# Patient Record
Sex: MALE | Race: WHITE | NOT HISPANIC OR LATINO | Employment: FULL TIME | URBAN - NONMETROPOLITAN AREA
[De-identification: names, ages, dates, MRNs, and addresses within clinical notes are randomized per-mention and may not be internally consistent; named-entity substitution may affect disease eponyms.]

---

## 2018-04-24 ENCOUNTER — TRANSCRIBE ORDERS (OUTPATIENT)
Dept: ADMINISTRATIVE | Facility: HOSPITAL | Age: 53
End: 2018-04-24

## 2018-04-24 ENCOUNTER — APPOINTMENT (OUTPATIENT)
Dept: LAB | Facility: HOSPITAL | Age: 53
End: 2018-04-24
Attending: INTERNAL MEDICINE

## 2018-04-24 DIAGNOSIS — I25.10 CAD IN NATIVE ARTERY: ICD-10-CM

## 2018-04-24 DIAGNOSIS — E78.00 HIGH CHOLESTEROL: Primary | ICD-10-CM

## 2018-04-24 DIAGNOSIS — E10.9 TYPE 1 DIABETES MELLITUS WITHOUT COMPLICATION (HCC): ICD-10-CM

## 2018-04-24 LAB
ALBUMIN SERPL-MCNC: 4 G/DL (ref 3.5–5)
ALBUMIN/GLOB SERPL: 1.5 G/DL (ref 1–2)
ALP SERPL-CCNC: 85 U/L (ref 38–126)
ALT SERPL W P-5'-P-CCNC: 29 U/L (ref 13–69)
ANION GAP SERPL CALCULATED.3IONS-SCNC: 16 MMOL/L (ref 10–20)
AST SERPL-CCNC: 19 U/L (ref 15–46)
BILIRUB SERPL-MCNC: 0.5 MG/DL (ref 0.2–1.3)
BUN BLD-MCNC: 10 MG/DL (ref 7–20)
BUN/CREAT SERPL: 9.1 (ref 6.3–21.9)
CALCIUM SPEC-SCNC: 9 MG/DL (ref 8.4–10.2)
CHLORIDE SERPL-SCNC: 107 MMOL/L (ref 98–107)
CHOLEST SERPL-MCNC: 115 MG/DL (ref 0–199)
CO2 SERPL-SCNC: 27 MMOL/L (ref 26–30)
CREAT BLD-MCNC: 1.1 MG/DL (ref 0.6–1.3)
GFR SERPL CREATININE-BSD FRML MDRD: 71 ML/MIN/1.73
GLOBULIN UR ELPH-MCNC: 2.7 GM/DL
GLUCOSE BLD-MCNC: 94 MG/DL (ref 74–98)
HBA1C MFR BLD: 5.5 % (ref 3–6)
HDLC SERPL-MCNC: 34 MG/DL (ref 40–60)
LDLC SERPL CALC-MCNC: 47 MG/DL (ref 0–99)
LDLC/HDLC SERPL: 1.39 {RATIO}
POTASSIUM BLD-SCNC: 4 MMOL/L (ref 3.5–5.1)
PROT SERPL-MCNC: 6.7 G/DL (ref 6.3–8.2)
SODIUM BLD-SCNC: 146 MMOL/L (ref 137–145)
TRIGL SERPL-MCNC: 169 MG/DL
TSH SERPL DL<=0.05 MIU/L-ACNC: 1.62 MIU/ML (ref 0.47–4.68)
VLDLC SERPL-MCNC: 33.8 MG/DL

## 2018-04-24 PROCEDURE — 36415 COLL VENOUS BLD VENIPUNCTURE: CPT | Performed by: INTERNAL MEDICINE

## 2018-04-24 PROCEDURE — 80053 COMPREHEN METABOLIC PANEL: CPT | Performed by: INTERNAL MEDICINE

## 2018-04-24 PROCEDURE — 80061 LIPID PANEL: CPT | Performed by: INTERNAL MEDICINE

## 2018-04-24 PROCEDURE — 84443 ASSAY THYROID STIM HORMONE: CPT | Performed by: INTERNAL MEDICINE

## 2018-04-24 PROCEDURE — 83036 HEMOGLOBIN GLYCOSYLATED A1C: CPT | Performed by: INTERNAL MEDICINE

## 2018-09-19 ENCOUNTER — TELEPHONE (OUTPATIENT)
Dept: SURGERY | Facility: CLINIC | Age: 53
End: 2018-09-19

## 2018-09-19 NOTE — TELEPHONE ENCOUNTER
Pt says he has never had a colonoscopy before.  He has had a heart attack and we will make him an appt to come in.

## 2018-09-19 NOTE — TELEPHONE ENCOUNTER
Dr. Thomas Wilks wants patient to have colonoscopy screening.  New patient to pcp.  They are not aware of any previous colonoscopy, no heart nor lung issues.  Please call patient to set up colonscopy. Thanks.

## 2018-10-02 ENCOUNTER — OFFICE VISIT (OUTPATIENT)
Dept: SURGERY | Facility: CLINIC | Age: 53
End: 2018-10-02

## 2018-10-02 VITALS
HEART RATE: 78 BPM | WEIGHT: 183.8 LBS | HEIGHT: 71 IN | TEMPERATURE: 98.4 F | BODY MASS INDEX: 25.73 KG/M2 | OXYGEN SATURATION: 99 % | DIASTOLIC BLOOD PRESSURE: 70 MMHG | SYSTOLIC BLOOD PRESSURE: 110 MMHG

## 2018-10-02 DIAGNOSIS — K59.04 CHRONIC IDIOPATHIC CONSTIPATION: ICD-10-CM

## 2018-10-02 DIAGNOSIS — Z12.11 SCREENING FOR COLON CANCER: Primary | ICD-10-CM

## 2018-10-02 PROCEDURE — 99243 OFF/OP CNSLTJ NEW/EST LOW 30: CPT | Performed by: SURGERY

## 2018-10-02 RX ORDER — POLYETHYLENE GLYCOL 3350 17 G/17G
238 POWDER, FOR SOLUTION ORAL ONCE
Qty: 14 PACKET | Refills: 0 | Status: SHIPPED | OUTPATIENT
Start: 2018-10-02 | End: 2018-10-02

## 2018-10-02 RX ORDER — ATORVASTATIN CALCIUM 40 MG/1
40 TABLET, FILM COATED ORAL DAILY
COMMUNITY
Start: 2018-09-27

## 2018-10-02 RX ORDER — RAMIPRIL 5 MG/1
5 CAPSULE ORAL DAILY
COMMUNITY
Start: 2018-09-27

## 2018-10-02 RX ORDER — BISACODYL 5 MG/1
5 TABLET, DELAYED RELEASE ORAL DAILY
Qty: 4 TABLET | Refills: 0 | Status: SHIPPED | OUTPATIENT
Start: 2018-10-02

## 2018-10-02 NOTE — PROGRESS NOTES
Patient: Tyron Araujo    YOB: 1965    Date: 10/02/2018    Primary Care Provider: Thomas Wilks MD    Chief Complaint   Patient presents with   • Constipation       Subjective .     History of present illness: Patient here for a colonoscopy.  Patient has had a recent history of constipation that is somewhat intermittent.  Has bowel movement every other day sometimes.  Complaints of hemorrhoids and occasional bleeding but only with wiping.  No abdominal pain.  No family history of colon cancer.  Never had a colonoscopy in the past.  He has taken a stool softener recently which has helped    The following portions of the patient's history were reviewed and updated as appropriate: allergies, current medications, past family history, past medical history, past social history, past surgical history and problem list.      Review of Systems   Constitutional: Negative for chills, fever and unexpected weight change.   HENT: Negative for trouble swallowing and voice change.    Eyes: Negative for visual disturbance.   Respiratory: Negative for apnea, cough, chest tightness, shortness of breath and wheezing.    Cardiovascular: Negative for chest pain, palpitations and leg swelling.   Gastrointestinal: Positive for blood in stool and constipation. Negative for abdominal distention, abdominal pain, anal bleeding, diarrhea, nausea, rectal pain and vomiting.   Endocrine: Negative for cold intolerance and heat intolerance.   Genitourinary: Negative for difficulty urinating, dysuria, flank pain, scrotal swelling and testicular pain.   Musculoskeletal: Negative for back pain, gait problem and joint swelling.   Skin: Negative for color change, rash and wound.   Neurological: Negative for dizziness, syncope, speech difficulty, weakness, numbness and headaches.   Hematological: Negative for adenopathy. Does not bruise/bleed easily.   Psychiatric/Behavioral: Negative for confusion. The patient is not  "nervous/anxious.        History:  Past Medical History:   Diagnosis Date   • Hyperlipidemia    • Hypertension        Past Surgical History:   Procedure Laterality Date   • APPENDECTOMY     • CARDIAC SURGERY      stent x 2       Family History   Problem Relation Age of Onset   • No Known Problems Mother    • No Known Problems Father    • No Known Problems Sister    • No Known Problems Brother        Social History   Substance Use Topics   • Smoking status: Current Every Day Smoker     Packs/day: 0.10   • Smokeless tobacco: Never Used   • Alcohol use No       Medications:   Current Outpatient Prescriptions:   •  aspirin 81 MG EC tablet, Take 81 mg by mouth Daily., Disp: , Rfl:   •  atorvastatin (LIPITOR) 40 MG tablet, , Disp: , Rfl:   •  ramipril (ALTACE) 5 MG capsule, , Disp: , Rfl:        Allergies: No Known Allergies    Objective     Vital Signs:  Vitals:    10/02/18 0920   BP: 110/70   Pulse: 78   Temp: 98.4 °F (36.9 °C)   TempSrc: Temporal Artery    SpO2: 99%   Weight: 83.4 kg (183 lb 12.8 oz)   Height: 180.3 cm (71\")       Physical Exam:   General Appearance:    Alert, cooperative, in no acute distress   Head:    Normocephalic, without obvious abnormality, atraumatic   Eyes:            Lids and lashes normal, conjunctivae and sclerae normal, no   icterus, no pallor, corneas clear, PERRL   Ears:    Ears appear intact with no abnormalities noted   Lungs:     Clear to auscultation     Heart:    Regular rate and rhythm    Abdomen:     Soft nontender nondistended.  No hepatosplenomegaly.     Extremities:   Moves all extremities well, no edema, no cyanosis, no             redness   Skin:   No bleeding, bruising or rash   Neurologic:   Cranial nerves 2 - 12 grossly intact, sensation intact  Psychiatric: No evidence of depression or anxiety   Results Review:   None    Review of Systems was reviewed and confirmed as accurate today.    Assessment/Plan :    1. Screening for colon cancer    2. Chronic idiopathic " constipation        I recommend a fiber supplement of choice daily for his constipation.  I explained to him that avoiding the constipation will help with the hemorrhoid issue.  I also recommend a screening colonoscopy.  He understands this procedure as well as the risks of bleeding and perforation and he understands the ramifications of these potential complications and he wishes to proceed.      Electronically signed by Burton Elias MD  10/02/18  9:15 AM      Portions of this note have been scribed for Burton Elias MD by Ania Florence. 10/2/2018  9:44 AM

## 2018-11-13 ENCOUNTER — HOSPITAL ENCOUNTER (OUTPATIENT)
Facility: HOSPITAL | Age: 53
Setting detail: HOSPITAL OUTPATIENT SURGERY
Discharge: HOME | End: 2018-11-13
Attending: SURGERY | Admitting: SURGERY

## 2018-11-13 ENCOUNTER — ANESTHESIA (OUTPATIENT)
Dept: GASTROENTEROLOGY | Facility: HOSPITAL | Age: 53
End: 2018-11-13

## 2018-11-13 ENCOUNTER — ANESTHESIA EVENT (OUTPATIENT)
Dept: GASTROENTEROLOGY | Facility: HOSPITAL | Age: 53
End: 2018-11-13

## 2018-11-13 VITALS
SYSTOLIC BLOOD PRESSURE: 126 MMHG | RESPIRATION RATE: 16 BRPM | BODY MASS INDEX: 26.6 KG/M2 | TEMPERATURE: 97.9 F | WEIGHT: 190 LBS | OXYGEN SATURATION: 95 % | HEART RATE: 61 BPM | DIASTOLIC BLOOD PRESSURE: 73 MMHG | HEIGHT: 71 IN

## 2018-11-13 DIAGNOSIS — Z12.11 SCREENING FOR COLON CANCER: ICD-10-CM

## 2018-11-13 PROCEDURE — 25010000002 PROPOFOL 10 MG/ML EMULSION: Performed by: NURSE ANESTHETIST, CERTIFIED REGISTERED

## 2018-11-13 PROCEDURE — S0260 H&P FOR SURGERY: HCPCS | Performed by: SURGERY

## 2018-11-13 RX ORDER — ONDANSETRON 2 MG/ML
4 INJECTION INTRAMUSCULAR; INTRAVENOUS ONCE AS NEEDED
Status: CANCELLED | OUTPATIENT
Start: 2018-11-13 | End: 2018-11-13

## 2018-11-13 RX ORDER — LIDOCAINE HYDROCHLORIDE 20 MG/ML
INJECTION, SOLUTION INFILTRATION; PERINEURAL AS NEEDED
Status: DISCONTINUED | OUTPATIENT
Start: 2018-11-13 | End: 2018-11-13 | Stop reason: SURG

## 2018-11-13 RX ORDER — PROPOFOL 10 MG/ML
VIAL (ML) INTRAVENOUS AS NEEDED
Status: DISCONTINUED | OUTPATIENT
Start: 2018-11-13 | End: 2018-11-13 | Stop reason: SURG

## 2018-11-13 RX ORDER — SODIUM CHLORIDE, SODIUM LACTATE, POTASSIUM CHLORIDE, CALCIUM CHLORIDE 600; 310; 30; 20 MG/100ML; MG/100ML; MG/100ML; MG/100ML
1000 INJECTION, SOLUTION INTRAVENOUS CONTINUOUS
Status: DISCONTINUED | OUTPATIENT
Start: 2018-11-13 | End: 2018-11-13 | Stop reason: HOSPADM

## 2018-11-13 RX ORDER — ONDANSETRON 2 MG/ML
4 INJECTION INTRAMUSCULAR; INTRAVENOUS ONCE AS NEEDED
Status: DISCONTINUED | OUTPATIENT
Start: 2018-11-13 | End: 2018-11-13 | Stop reason: HOSPADM

## 2018-11-13 RX ADMIN — LIDOCAINE HYDROCHLORIDE 100 MG: 20 INJECTION, SOLUTION INFILTRATION; PERINEURAL at 12:33

## 2018-11-13 RX ADMIN — SODIUM CHLORIDE, POTASSIUM CHLORIDE, SODIUM LACTATE AND CALCIUM CHLORIDE 1000 ML: 600; 310; 30; 20 INJECTION, SOLUTION INTRAVENOUS at 10:58

## 2018-11-13 RX ADMIN — PROPOFOL 200 MG: 10 INJECTION, EMULSION INTRAVENOUS at 13:00

## 2018-11-13 NOTE — H&P
"    [facility]   HISTORY AND PHYSICAL      Name:  Tyron Araujo   Age:  53 y.o.  Sex:  male  :  1965  MRN:  [id number]   Visit Number:  [id number]  Admission Date:  2018  Date Of Service:  18  Primary Care Physician:  Thomas Wilks MD    Chief Complaint:     I need a colonoscopy    History Of Presenting Illness:      She presents for colonoscopy.  Review Of Systems:     General ROS: Patient denies any fevers, chills or loss of consciousness.  No complaints of generalized weakness  Psychological ROS: Denies any hallucinations and delusions.  Respiratory ROS: Denies cough or shortness of breath.   Cardiovascular ROS: Denies chest pain or palpitations. No history of exertional chest pain.   Gastrointestinal ROS: Denies nausea and vomiting. Denies any abdominal pain. No diarrhea.Has history of constipation   Genito-Urinary ROS: Denies dysuria or hematuria.  Neurological ROS: Denies any focal weakness. No loss of consciousness. Denies any numbness.   Dermatological ROS: Denies any redness or pruritis.     Past Medical History:    Past Medical History:   Diagnosis Date   • Arthritis    • History of bronchitis    • Hyperlipidemia    • Hypertension    • Sleep apnea     PER PT, DR. QUINTERO SAYS I HAVE SLEEP APNEA. PT DENIES EVER HAVING A SLEEP STUDY OR WEARING A CPAP/BIPAP   • Tattoos     X 2   • Wears glasses     RX       Past Surgical history:    Past Surgical History:   Procedure Laterality Date   • AMPUTATION DIGIT Left     LOST \"TIP\" OF 2 FINGERS, MIDDLE AND RING FINGER   • APPENDECTOMY     • CARDIAC SURGERY  2015    stent x 2   • MULTIPLE TOOTH EXTRACTIONS         Social History:    Social History     Socioeconomic History   • Marital status:      Spouse name: Not on file   • Number of children: Not on file   • Years of education: Not on file   • Highest education level: Not on file   Social Needs   • Financial resource strain: Not on file   • Food " insecurity - worry: Not on file   • Food insecurity - inability: Not on file   • Transportation needs - medical: Not on file   • Transportation needs - non-medical: Not on file   Occupational History   • Not on file   Tobacco Use   • Smoking status: Current Every Day Smoker     Packs/day: 0.10     Types: Cigarettes   • Smokeless tobacco: Never Used   • Tobacco comment: 4-5 CIGARETTES A DAY    Substance and Sexual Activity   • Alcohol use: No   • Drug use: No   • Sexual activity: Defer   Other Topics Concern   • Not on file   Social History Narrative   • Not on file       Family History:    Family History   Problem Relation Age of Onset   • No Known Problems Mother    • No Known Problems Father    • No Known Problems Sister    • No Known Problems Brother        Allergies:      Patient has no known allergies.    Home Medications:    Prior to Admission Medications     Prescriptions Last Dose Informant Patient Reported? Taking?    aspirin 81 MG EC tablet 11/12/2018 Self Yes Yes    Take 81 mg by mouth Daily.    atorvastatin (LIPITOR) 40 MG tablet 11/12/2018 Self Yes Yes    Take 40 mg by mouth Daily.    bisacodyl (bisacodyl) 5 MG EC tablet 11/12/2018  No Yes    Take 1 tablet by mouth Daily.    ramipril (ALTACE) 5 MG capsule 11/13/2018 Self Yes Yes    Take 5 mg by mouth Daily.             ED Medications:    Medications   lactated ringers infusion 1,000 mL (1,000 mL Intravenous New Bag 11/13/18 1058)       Vital Signs:    Temp:  [97.7 °F (36.5 °C)] 97.7 °F (36.5 °C)  Heart Rate:  [57] 57  Resp:  [18] 18  BP: (123)/(78) 123/78        11/12/18  0927   Weight: 86.2 kg (190 lb)       Body mass index is 26.5 kg/m².    Physical Exam:      General Appearance:  Alert and cooperative, not in any acute distress.   Head:  Atraumatic and normocephalic, without obvious abnormality.   Eyes:          PERRLA, conjunctivae and sclerae normal, no Icterus. No pallor. Extra-occular movements are within normal limits.   Ears:  Ears appear  intact with no abnormalities noted.   Respiratory/Lungs:   Breath sounds heard bilaterally equally.  No crackles or wheezing. No Pleural rub or bronchial breathing. Normal respiratory effort.    Cardiovascular/Heart:  Normal S1 and S2,    GI/Abdomen:   No masses, no hepatosplenomegaly. Soft, non-tender, non-distended, no hernia                 Musculoskeletal/ Extremities:   Moves all extremities well   Skin: No bleeding, bruising or rash, no induration   Psychiatric : Alert and oriented ×3.  No depression or anxiety    Neurologic: Cranial nerves 2 - 12 grossly intact, sensation intact, Motor power is normal and equal bilaterally.       EKG:          Labs:    Lab Results (last 24 hours)     ** No results found for the last 24 hours. **          Radiology:    Imaging Results (last 72 hours)     ** No results found for the last 72 hours. **          Assessment:    Screening colonoscopy     Plan:     I recommend a screening colonoscopy to the patient.  The patient understands the procedure and the reason for the procedure.  The patient also understands the risks which include but are not limited to bleeding and perforation and they understand the ramifications of these potential complications and wish to proceed.    Burton Elias MD  11/13/18  12:39 PM

## 2018-11-13 NOTE — ANESTHESIA POSTPROCEDURE EVALUATION
Patient: Tyron Araujo    Procedure Summary     Date:  11/13/18 Room / Location:  [facility] ENDOSCOPY 1 / [facility] ENDOSCOPY    Anesthesia Start:  1230 Anesthesia Stop:  1302    Procedure:  COLONOSCOPY W/ COLD SNARE POLYPECTOOMY X1 (N/A Anus) Diagnosis:       Screening for colon cancer      (Screening for colon cancer [Z12.11])    Surgeon:  Burton Elias MD Provider:  Elgin Barrios CRNA    Anesthesia Type:  MAC ASA Status:  2          Anesthesia Type: MAC  Last vitals  BP   126/73 (11/13/18 1340)   Temp   97.9 °F (36.6 °C) (11/13/18 1313)   Pulse   61 (11/13/18 1340)   Resp   16 (11/13/18 1340)     SpO2   95 % (11/13/18 1340)     Post Anesthesia Care and Evaluation    Patient location during evaluation: PHASE II  Patient participation: complete - patient participated  Level of consciousness: awake  Pain score: 3  Pain management: adequate  Airway patency: patent  Anesthetic complications: No anesthetic complications  PONV Status: controlled  Cardiovascular status: acceptable and stable  Respiratory status: acceptable  Hydration status: acceptable

## 2018-11-13 NOTE — ANESTHESIA PREPROCEDURE EVALUATION
Anesthesia Evaluation     Patient summary reviewed and Nursing notes reviewed   no history of anesthetic complications:  NPO Solid Status: > 8 hours  NPO Liquid Status: > 8 hours           Airway   Mallampati: II  TM distance: >3 FB  Neck ROM: full  no difficulty expected  Dental - normal exam     Pulmonary - normal exam   (+) a smoker Current Smoked day of surgery,   Cardiovascular - normal exam    Rhythm: regular  Rate: normal    (+) hypertension well controlled less than 2 medications, hyperlipidemia,       Neuro/Psych- negative ROS  GI/Hepatic/Renal/Endo - negative ROS     Musculoskeletal     Abdominal    Substance History - negative use     OB/GYN negative ob/gyn ROS         Other   (+) arthritis                     Anesthesia Plan    ASA 2     MAC   (Pt told that intravenous sedation will be used as the primary anesthetic along with local anesthesia if necessary. Every effort will be made to make sure the patient is comfortable.     The patient was told they may or may not have recall for the procedure. It was further explained that if the MAC was not adequate that a general anesthetic with either an LMA or endotracheal tube would be required.     Will proceed with the plan of care.)  intravenous induction   Anesthetic plan, all risks, benefits, and alternatives have been provided, discussed and informed consent has been obtained with: patient.

## 2018-11-13 NOTE — DISCHARGE INSTRUCTIONS
Please follow all post op instructions and follow up appointment time from your physician's office included in your discharge packet.     No pushing, pulling, tugging,  heavy lifting, or strenuous activity.  No major decision making, driving, or drinking alcoholic beverages for 24 hours. ( due to the medications you have  received)  Always use good hand hygiene/washing techniques.  NO driving while taking pain medications.    To assist you in voiding:  Drink plenty of fluids  Listen to running water while attempting to void.    If you are unable to urinate and you have an uncomfortable urge to void or it has been   6 hours since you were discharged, return to the Emergency Room

## 2018-11-17 LAB
LAB AP CASE REPORT: NORMAL
PATH REPORT.FINAL DX SPEC: NORMAL

## 2019-04-30 ENCOUNTER — TRANSCRIBE ORDERS (OUTPATIENT)
Dept: LAB | Facility: HOSPITAL | Age: 54
End: 2019-04-30

## 2019-04-30 ENCOUNTER — LAB (OUTPATIENT)
Dept: LAB | Facility: HOSPITAL | Age: 54
End: 2019-04-30

## 2019-04-30 DIAGNOSIS — E11.9 DIABETES MELLITUS WITHOUT COMPLICATION (HCC): ICD-10-CM

## 2019-04-30 DIAGNOSIS — E78.00 HIGH CHOLESTEROL: Primary | ICD-10-CM

## 2019-04-30 DIAGNOSIS — E78.00 HIGH CHOLESTEROL: ICD-10-CM

## 2019-04-30 LAB
ALBUMIN SERPL-MCNC: 4.3 G/DL (ref 3.5–5)
ALBUMIN/GLOB SERPL: 1.5 G/DL (ref 1–2)
ALP SERPL-CCNC: 81 U/L (ref 38–126)
ALT SERPL W P-5'-P-CCNC: 23 U/L (ref 13–69)
ANION GAP SERPL CALCULATED.3IONS-SCNC: 10.2 MMOL/L (ref 10–20)
AST SERPL-CCNC: 21 U/L (ref 15–46)
BILIRUB SERPL-MCNC: 0.5 MG/DL (ref 0.2–1.3)
BUN BLD-MCNC: 10 MG/DL (ref 7–20)
BUN/CREAT SERPL: 9.1 (ref 6.3–21.9)
CALCIUM SPEC-SCNC: 9.2 MG/DL (ref 8.4–10.2)
CHLORIDE SERPL-SCNC: 104 MMOL/L (ref 98–107)
CHOLEST SERPL-MCNC: 138 MG/DL (ref 0–199)
CO2 SERPL-SCNC: 27 MMOL/L (ref 26–30)
CREAT BLD-MCNC: 1.1 MG/DL (ref 0.6–1.3)
GFR SERPL CREATININE-BSD FRML MDRD: 70 ML/MIN/1.73
GLOBULIN UR ELPH-MCNC: 2.8 GM/DL
GLUCOSE BLD-MCNC: 98 MG/DL (ref 74–98)
HBA1C MFR BLD: 5.6 % (ref 3–6)
HDLC SERPL-MCNC: 33 MG/DL (ref 40–60)
LDLC SERPL CALC-MCNC: 66 MG/DL (ref 0–99)
LDLC/HDLC SERPL: 1.99 {RATIO}
POTASSIUM BLD-SCNC: 4.2 MMOL/L (ref 3.5–5.1)
PROT SERPL-MCNC: 7.1 G/DL (ref 6.3–8.2)
SODIUM BLD-SCNC: 137 MMOL/L (ref 137–145)
TRIGL SERPL-MCNC: 197 MG/DL
VLDLC SERPL-MCNC: 39.4 MG/DL

## 2019-04-30 PROCEDURE — 36415 COLL VENOUS BLD VENIPUNCTURE: CPT

## 2019-04-30 PROCEDURE — 80053 COMPREHEN METABOLIC PANEL: CPT

## 2019-04-30 PROCEDURE — 80061 LIPID PANEL: CPT

## 2019-04-30 PROCEDURE — 83036 HEMOGLOBIN GLYCOSYLATED A1C: CPT

## 2022-11-21 ENCOUNTER — HOSPITAL ENCOUNTER (OUTPATIENT)
Dept: GENERAL RADIOLOGY | Facility: HOSPITAL | Age: 57
Discharge: HOME | End: 2022-11-21
Admitting: NURSE PRACTITIONER

## 2022-11-21 ENCOUNTER — TRANSCRIBE ORDERS (OUTPATIENT)
Dept: GENERAL RADIOLOGY | Facility: HOSPITAL | Age: 57
End: 2022-11-21

## 2022-11-21 DIAGNOSIS — S69.91XA INJURY OF HAND, RIGHT, INITIAL ENCOUNTER: Primary | ICD-10-CM

## 2022-11-21 DIAGNOSIS — S69.91XA INJURY OF HAND, RIGHT, INITIAL ENCOUNTER: ICD-10-CM

## 2022-11-21 PROCEDURE — 73130 X-RAY EXAM OF HAND: CPT

## 2022-12-12 ENCOUNTER — OFFICE VISIT (OUTPATIENT)
Dept: UROLOGY | Facility: CLINIC | Age: 57
End: 2022-12-12

## 2022-12-12 VITALS
HEART RATE: 70 BPM | RESPIRATION RATE: 16 BRPM | HEIGHT: 71 IN | SYSTOLIC BLOOD PRESSURE: 134 MMHG | DIASTOLIC BLOOD PRESSURE: 78 MMHG | BODY MASS INDEX: 25.2 KG/M2 | OXYGEN SATURATION: 98 % | TEMPERATURE: 97.1 F | WEIGHT: 180 LBS

## 2022-12-12 DIAGNOSIS — R97.20 ELEVATED PROSTATE SPECIFIC ANTIGEN (PSA): Primary | ICD-10-CM

## 2022-12-12 LAB
BILIRUB BLD-MCNC: ABNORMAL MG/DL
CLARITY, POC: CLEAR
COLOR UR: ABNORMAL
EXPIRATION DATE: ABNORMAL
GLUCOSE UR STRIP-MCNC: NEGATIVE MG/DL
KETONES UR QL: NEGATIVE
LEUKOCYTE EST, POC: NEGATIVE
Lab: ABNORMAL
NITRITE UR-MCNC: NEGATIVE MG/ML
PH UR: 6 [PH] (ref 5–8)
PROT UR STRIP-MCNC: NEGATIVE MG/DL
RBC # UR STRIP: NEGATIVE /UL
SP GR UR: 1.03 (ref 1–1.03)
UROBILINOGEN UR QL: NORMAL

## 2022-12-12 PROCEDURE — 51798 US URINE CAPACITY MEASURE: CPT | Performed by: PHYSICIAN ASSISTANT

## 2022-12-12 PROCEDURE — 81003 URINALYSIS AUTO W/O SCOPE: CPT | Performed by: PHYSICIAN ASSISTANT

## 2022-12-12 PROCEDURE — 99204 OFFICE O/P NEW MOD 45 MIN: CPT | Performed by: PHYSICIAN ASSISTANT

## 2022-12-12 RX ORDER — AMLODIPINE BESYLATE 5 MG/1
TABLET ORAL
COMMUNITY
Start: 2022-11-21

## 2022-12-12 NOTE — PROGRESS NOTES
Chief Complaint  Elevated PSA    Referring Provider  Katty Stevenson APRN HPI  Mr. Araujo is a 57 y.o.  male who presents with an elevated PSA to 6.2 on 11/21/22.    He states he has never seen a urologist. His PSA was about 3.0 in 2018 and has been gradually increasing since. He describes a trend for 4.8 to 5.3, and now above 6. He denies FH of prostate cancer.       Patient complains of nocturia.  His IPSS score is 9.       IPSS Questionnaire (AUA-7):  Incomplete emptying  Over the past month, how often have you had a sensation of not emptying your bladder completely after you finish?: Less than 1 time in 5 (12/12/22 0945)  Frequency  Over the past month, how often have you had to urinate again less than two hours after you finishing urinating ?: Less than half the time (12/12/22 0945)  Intermittency  Over the past month, how often have you found you stopped and started again several time when you urinated ?: Less thank 1 time in 5 (12/12/22 0945)  Urgency  Over the last month, how difficult  have you found it to postpone urination ?: Less than 1 time in 5 (12/12/22 0945)  Weak Stream  Over the past month, how often have you had a weak urinary stream ?: Less than half the time (12/12/22 0945)  Straining  Over the past month, how often have you had to push or strain to begin urination ?: Less than 1 time 5 (12/12/22 0945)  Nocturia  Over the past month, how many times did you most typically get up to urinate from the time you went to bed until the time you got up in the morning ?: Less than 1 time in 5 (12/12/22 0945)  Quality of life due to urinary symptoms  If you were to spend the rest of your life with your urinary condition the way it is now, how would feel about that?: Mixed - about equally satisfied (12/12/22 0945)    Scores  Total IPSS Score: 9 (12/12/22 0945)  Total Score = Symtomatic Level: moderately symptomatic: 8-19 (12/12/22 0945)       Past Medical History  Past Medical History:  "  Diagnosis Date   • Arthritis    • History of bronchitis    • Hyperlipidemia    • Hypertension    • Sleep apnea     PER PT, DR. QUINTERO SAYS I HAVE SLEEP APNEA. PT DENIES EVER HAVING A SLEEP STUDY OR WEARING A CPAP/BIPAP   • Tattoos     X 2   • Wears glasses     RX       Past Surgical History  Past Surgical History:   Procedure Laterality Date   • AMPUTATION DIGIT Left     LOST \"TIP\" OF 2 FINGERS, MIDDLE AND RING FINGER   • APPENDECTOMY     • CARDIAC SURGERY  03/2015    stent x 2   • COLONOSCOPY N/A 11/13/2018    Procedure: COLONOSCOPY W/ COLD SNARE POLYPECTOOMY X1;  Surgeon: Burton Elias MD;  Location: [facility] ENDOSCOPY;  Service: Gastroenterology   • MULTIPLE TOOTH EXTRACTIONS         Medications    Current Outpatient Medications:   •  amLODIPine (NORVASC) 5 MG tablet, , Disp: , Rfl:   •  aspirin 81 MG EC tablet, Take 81 mg by mouth Daily., Disp: , Rfl:   •  atorvastatin (LIPITOR) 40 MG tablet, Take 40 mg by mouth Daily., Disp: , Rfl:   •  bisacodyl (bisacodyl) 5 MG EC tablet, Take 1 tablet by mouth Daily., Disp: 4 tablet, Rfl: 0  •  ramipril (ALTACE) 5 MG capsule, Take 5 mg by mouth Daily., Disp: , Rfl:     Allergies  No Known Allergies    Social History  Social History     Tobacco Use   • Smoking status: Every Day     Packs/day: 0.10     Types: Cigarettes   • Smokeless tobacco: Never   • Tobacco comments:     4-5 CIGARETTES A DAY    Substance Use Topics   • Alcohol use: No   • Drug use: No       Family History  Family History   Problem Relation Age of Onset   • No Known Problems Mother    • No Known Problems Father    • No Known Problems Sister    • No Known Problems Brother       He has no family history of prostate cancer      Physical Exam  Visit Vitals  /78 (BP Location: Left arm, Patient Position: Sitting, Cuff Size: Adult)   Pulse 70   Temp 97.1 °F (36.2 °C) (Temporal)   Resp 16   Ht 180.3 cm (71\")   Wt 81.6 kg (180 lb)   SpO2 98%   BMI 25.10 kg/m²         Labs  Brief Urine Lab Results  (Last " result in the past 365 days)      Color   Clarity   Blood   Leuk Est   Nitrite   Protein   CREAT   Urine HCG        12/12/22 1022 Dark Yellow   Clear   Negative   Negative   Negative   Negative                 Assessment  Mr. Araujo is a 57 y.o.  male who presents with elevated PSA to 6.2.  This is a new diagnosis of uncertain clinical prognosis.    He states this has been a trend since 2018. He was 3.0 then, now yearly trending up from 4.8 to 5.3, now 6.2.    I explained to the patient that an elevated PSA is a marker of risk of prostate cancer and a prostate biopsy would be the next step in diagnosis. I explained that sampling error can occur with any biopsy and there is a risk of potentially missing a cancer that may be present.    I discussed the risks, benefits, and alternatives to prostate biopsy, including hematuria, hematochezia, and hematospermia.  I also discussed the risk of diagnosing a clinically-insignificant prostate cancer.  I discussed the risks of sepsis, which can be minimized by prophylactic antibiotics.       Plan  1. I have recommended TRUS biopsy of prostate  2. I provided a prescription for Ciprofloxacin 500mg PO BID and Keflex 500mg PO BID for 3 days to start the day prior to biopsy, as well as fleets enema  3. No blood thinners- ASA 81mg only

## 2022-12-13 RX ORDER — CEPHALEXIN 500 MG/1
500 CAPSULE ORAL 2 TIMES DAILY
Qty: 6 CAPSULE | Refills: 0 | Status: SHIPPED | OUTPATIENT
Start: 2022-12-13 | End: 2022-12-16

## 2022-12-13 RX ORDER — MAGNESIUM HYDROXIDE 1200 MG/15ML
1 LIQUID ORAL ONCE AS NEEDED
Qty: 1 ENEMA | Refills: 0 | Status: SHIPPED | OUTPATIENT
Start: 2022-12-13

## 2022-12-13 RX ORDER — CIPROFLOXACIN 500 MG/1
500 TABLET, FILM COATED ORAL 2 TIMES DAILY
Qty: 6 TABLET | Refills: 0 | Status: SHIPPED | OUTPATIENT
Start: 2022-12-13

## 2023-01-23 ENCOUNTER — PROCEDURE VISIT (OUTPATIENT)
Dept: UROLOGY | Facility: CLINIC | Age: 58
End: 2023-01-23
Payer: COMMERCIAL

## 2023-01-23 DIAGNOSIS — R97.20 ELEVATED PROSTATE SPECIFIC ANTIGEN (PSA): Primary | ICD-10-CM

## 2023-01-23 PROCEDURE — 55700 PR PROSTATE NEEDLE BIOPSY ANY APPROACH: CPT | Performed by: UROLOGY

## 2023-01-23 PROCEDURE — 76942 ECHO GUIDE FOR BIOPSY: CPT | Performed by: UROLOGY

## 2023-01-23 NOTE — PATIENT INSTRUCTIONS
No heavy lifting > greater than 30 pounds for 1 week.  Patient needs several days off after prostate biopsy has my permission.

## 2023-01-23 NOTE — PROGRESS NOTES
TRUS BIOPSY OF PROSTATE    Preoperative diagnosis  Elevated PSA    Postoperative diagnosis  Elevated PSA    Procedure  1.  Transrectal ultrasound of the prostate  2.  Transrectal ultrasound guidance of needle biopsy  3.  Prostate biopsy    Attending Surgeon  Nelson Giron MD    Anesthesia  2% lidocaine jelly, intrarectal instillation, 10mL  1% lidocaine solution, periprostatic injection, 10mL    Complications  None    Specimen  Prostate biopsy x 14    Indications  Mr. Araujo is a 58 y.o. year old male with an elevated PSA: No results found for: PSA        After discussing his options, the patient decided to proceed with prostate biopsy.  Informed consent was obtained. Possible complications were discussed with the patient during his last visit including, but not limited to, hematuria, hematochezia, prostatitis, urinary tract infection, sepsis, and urinary retention.  He did start the prescribed oral antibiotic regimen.    Procedure  The patient was positioned and prepped in a left lateral position with lower extremities flexed.  Lidocaine jelly, 2%, was injected per rectum. A digital rectal exam was performed.The Behind the [employer]er E8CS rectal ultrasound probe was slowly introduced into the rectum without difficulty.  The prostate and seminal vesicles were inspected systematically using cross and sagittal views with the ultrasound.  The dimensions of the prostate were measured, for a calculated volume of 45 mL.  Using a true cut 14 Fr biopsy needle, 14 prostate cores were collected. The specific locations were the following: left lateral base, left lateral mid, left lateral apex, left medial base, left medial mid, and left medial apex, right lateral base, right lateral mid, right lateral apex, right medial base, right medial mid, right medial apex, and right and left transition zones. The rectal ultrasound probe was removed.  A DIAMOND was again performed revealing little blood in the rectum.  The patient tolerated the procedure  well.    Plan  1.  The patient was instructed to drink plenty of fluids and warned about possible complications and side effects including, but not limited to, blood in the urine, stool and semen as well as bloodstream infection.  He was instructed to call the office if there are any issues, especially fevers or flu-like symptoms.    2.  Continue antibiotic for a total of 3 days.  3.  The patient will return to clinic in approximately 1 week for discussion of the pathological report.

## 2023-01-26 LAB — REF LAB TEST METHOD: NORMAL

## 2023-01-30 ENCOUNTER — OFFICE VISIT (OUTPATIENT)
Dept: UROLOGY | Facility: CLINIC | Age: 58
End: 2023-01-30
Payer: COMMERCIAL

## 2023-01-30 DIAGNOSIS — R97.20 ELEVATED PROSTATE SPECIFIC ANTIGEN (PSA): Primary | ICD-10-CM

## 2023-01-30 PROCEDURE — 99442 PR PHYS/QHP TELEPHONE EVALUATION 11-20 MIN: CPT | Performed by: UROLOGY

## 2023-01-30 NOTE — PROGRESS NOTES
12-minute telephone conversation.    I reviewed the patient's prostate biopsy results with him, which were negative for any cancer.  We did also discussed his lower urinary tract symptoms.  He has difficulty voiding the first time when he gets up in the morning, but after that he states his urinary stream is normal for the rest of the day and is fairly happy with it.  I offered him medications or further work-up, and he wants to hold off on those for now.    We will have him follow-up in a year with repeat PSA with my SERENE.  I would get an MRI if he goes up substantially, but if it goes up just a little bit or stay stable, consistent with BPH, we would just continue yearly screening.     You have chosen to receive care through a telephone visit. Do you consent to use a telephone visit for your medical care today? Yes

## 2023-02-01 ENCOUNTER — HOSPITAL ENCOUNTER (EMERGENCY)
Facility: HOSPITAL | Age: 58
Discharge: HOME | End: 2023-02-01
Attending: STUDENT IN AN ORGANIZED HEALTH CARE EDUCATION/TRAINING PROGRAM | Admitting: STUDENT IN AN ORGANIZED HEALTH CARE EDUCATION/TRAINING PROGRAM
Payer: COMMERCIAL

## 2023-02-01 VITALS
BODY MASS INDEX: 25.2 KG/M2 | WEIGHT: 180 LBS | HEART RATE: 56 BPM | OXYGEN SATURATION: 100 % | DIASTOLIC BLOOD PRESSURE: 94 MMHG | HEIGHT: 71 IN | SYSTOLIC BLOOD PRESSURE: 156 MMHG | TEMPERATURE: 97.6 F | RESPIRATION RATE: 14 BRPM

## 2023-02-01 DIAGNOSIS — N40.0 ENLARGED PROSTATE: Primary | ICD-10-CM

## 2023-02-01 LAB
BACTERIA UR QL AUTO: ABNORMAL /HPF
BILIRUB UR QL STRIP: NEGATIVE
CLARITY UR: CLEAR
COLOR UR: YELLOW
GLUCOSE UR STRIP-MCNC: NEGATIVE MG/DL
HGB UR QL STRIP.AUTO: ABNORMAL
HYALINE CASTS UR QL AUTO: ABNORMAL /LPF
KETONES UR QL STRIP: NEGATIVE
LEUKOCYTE ESTERASE UR QL STRIP.AUTO: NEGATIVE
NITRITE UR QL STRIP: NEGATIVE
PH UR STRIP.AUTO: 6 [PH] (ref 5–8)
PROT UR QL STRIP: NEGATIVE
RBC # UR STRIP: ABNORMAL /HPF
REF LAB TEST METHOD: ABNORMAL
SP GR UR STRIP: 1.01 (ref 1–1.03)
SQUAMOUS #/AREA URNS HPF: ABNORMAL /HPF
UROBILINOGEN UR QL STRIP: ABNORMAL
WBC # UR STRIP: ABNORMAL /HPF

## 2023-02-01 PROCEDURE — 81001 URINALYSIS AUTO W/SCOPE: CPT | Performed by: PHYSICIAN ASSISTANT

## 2023-02-01 PROCEDURE — 99283 EMERGENCY DEPT VISIT LOW MDM: CPT

## 2023-02-01 RX ORDER — TAMSULOSIN HYDROCHLORIDE 0.4 MG/1
1 CAPSULE ORAL DAILY
Qty: 30 CAPSULE | Refills: 0 | Status: SHIPPED | OUTPATIENT
Start: 2023-02-01 | End: 2023-03-17 | Stop reason: SDUPTHER

## 2023-02-01 RX ORDER — TAMSULOSIN HYDROCHLORIDE 0.4 MG/1
0.4 CAPSULE ORAL ONCE
Status: COMPLETED | OUTPATIENT
Start: 2023-02-01 | End: 2023-02-01

## 2023-02-01 RX ORDER — TAMSULOSIN HYDROCHLORIDE 0.4 MG/1
1 CAPSULE ORAL DAILY
Qty: 30 CAPSULE | Refills: 0 | Status: SHIPPED | OUTPATIENT
Start: 2023-02-01 | End: 2023-02-01 | Stop reason: SDUPTHER

## 2023-02-01 RX ADMIN — TAMSULOSIN HYDROCHLORIDE 0.4 MG: 0.4 CAPSULE ORAL at 20:14

## 2023-02-02 NOTE — ED PROVIDER NOTES
"Subjective  History of Present Illness:    Chief Complaint: Blood in urine  History of Present Illness: Patient is a 58-year-old male who presents today for blood in urine.  Patient states that he had a prostate biopsy done 9 days ago due to elevation of PSA and has been noticing intermittent blood in urine since.  Patient states that after the biopsy, there was a lot of blood but that it has gone down some since.  Patient stated that today after work he noticed that he had \"a lot\" of blood in his urine, and that his urine almost looked black.  Patient states that he still strains to urinate but he always does and that this is not more than normal.  Patient denies any pain with urination, weight loss.  Patient is not a smoker.  Onset: Blood in urine since 9 days ago after prostate biopsy.  Duration: 9 days  Exacerbating / Alleviating factors: Blood in urine which happened after working a long shift  Associated symptoms: No painful urination      Nurses Notes reviewed and agree, including vitals, allergies, social history and prior medical history.     REVIEW OF SYSTEMS: All systems reviewed and not pertinent unless noted.    Review of Systems   Genitourinary: Positive for difficulty urinating (Patient has been straining to urinate but no more than he regularly does) and hematuria. Negative for dysuria and frequency.   All other systems reviewed and are negative.      Past Medical History:   Diagnosis Date   • Arthritis    • History of bronchitis    • Hyperlipidemia    • Hypertension    • Sleep apnea     PER PT, DR. QUINTERO SAYS I HAVE SLEEP APNEA. PT DENIES EVER HAVING A SLEEP STUDY OR WEARING A CPAP/BIPAP   • Tattoos     X 2   • Wears glasses     RX       Allergies:    Patient has no known allergies.      Past Surgical History:   Procedure Laterality Date   • AMPUTATION DIGIT Left     LOST \"TIP\" OF 2 FINGERS, MIDDLE AND RING FINGER   • APPENDECTOMY     • CARDIAC SURGERY  03/2015    stent x 2   • COLONOSCOPY N/A " "11/13/2018    Procedure: COLONOSCOPY W/ COLD SNARE POLYPECTOOMY X1;  Surgeon: Burton Elias MD;  Location: [facility] ENDOSCOPY;  Service: Gastroenterology   • MULTIPLE TOOTH EXTRACTIONS           Social History     Socioeconomic History   • Marital status:    Tobacco Use   • Smoking status: Every Day     Packs/day: 0.10     Types: Cigarettes   • Smokeless tobacco: Never   • Tobacco comments:     4-5 CIGARETTES A DAY    Substance and Sexual Activity   • Alcohol use: No   • Drug use: No   • Sexual activity: Defer         Family History   Problem Relation Age of Onset   • No Known Problems Mother    • No Known Problems Father    • No Known Problems Sister    • No Known Problems Brother        Objective  Physical Exam:  /94 (BP Location: Left arm, Patient Position: Sitting)   Pulse 56   Temp 97.6 °F (36.4 °C) (Oral)   Resp 14   Ht 180.3 cm (71\")   Wt 81.6 kg (180 lb)   SpO2 100%   BMI 25.10 kg/m²      Physical Exam  Constitutional:       Appearance: Normal appearance.   HENT:      Head: Normocephalic and atraumatic.   Eyes:      Extraocular Movements: Extraocular movements intact.      Pupils: Pupils are equal, round, and reactive to light.   Musculoskeletal:      Cervical back: Normal range of motion and neck supple.   Skin:     General: Skin is warm and dry.   Neurological:      General: No focal deficit present.      Mental Status: He is alert and oriented to person, place, and time.   Psychiatric:         Mood and Affect: Mood normal.         Behavior: Behavior normal.           Procedures    ED Course:    ED Course as of 02/01/23 2040 Wed Feb 01, 2023   1950 Patient's urine is clear tonight, he reports that this afternoon when he left work he had a bowel movement and urinated and his urine was much darker and bloody but now it is clear he is asking if we can give him Flomax it was offered to him by his urologist and he held off at that time but would like to try it. [CS]      ED Course User " Index  [CS] Sahy Sanchez Jr., PA-C       Lab Results (last 24 hours)     Procedure Component Value Units Date/Time    Urinalysis With Culture If Indicated - Urine, Clean Catch [[id number]]  (Abnormal) Collected: 02/01/23 2013    Specimen: Urine, Clean Catch Updated: 02/01/23 2021     Color, UA Yellow     Appearance, UA Clear     pH, UA 6.0     Specific Gravity, UA 1.006     Glucose, UA Negative     Ketones, UA Negative     Bilirubin, UA Negative     Blood, UA Large (3+)     Protein, UA Negative     Leuk Esterase, UA Negative     Nitrite, UA Negative     Urobilinogen, UA 0.2 E.U./dL    Narrative:      In absence of clinical symptoms, the presence of pyuria, bacteria, and/or nitrites on the urinalysis result does not correlate with infection.    Urinalysis, Microscopic Only - Urine, Clean Catch [[id number]]  (Abnormal) Collected: 02/01/23 2013    Specimen: Urine, Clean Catch Updated: 02/01/23 2036     RBC, UA 6-12 /HPF      WBC, UA 0-2 /HPF      Comment: Urine culture not indicated.        Bacteria, UA None Seen /HPF      Squamous Epithelial Cells, UA 0-2 /HPF      Hyaline Casts, UA None Seen /LPF      Methodology Manual Light Microscopy           No radiology results from the last 24 hrs       MDM      Final diagnoses:   Enlarged prostate        Shay Sanchez Jr., PA-C  02/01/23 2040

## 2023-02-06 ENCOUNTER — TELEPHONE (OUTPATIENT)
Dept: UROLOGY | Facility: CLINIC | Age: 58
End: 2023-02-06

## 2023-02-06 NOTE — TELEPHONE ENCOUNTER
Reiterate to him that it is normal to have bleeding for up to 2 weeks.  Have him stop his aspirin for about 5 days.  It should clear up.

## 2023-02-06 NOTE — TELEPHONE ENCOUNTER
Caller: YOMAIRA ALLRED     Relationship: SELF    Best call back number: [phone]    INCOMING CALL FROM PT. PT REQUESTING TO SPEAK TO DR. ERNST. ATTEMPTED TO REACH CLINICAL TEAM, COULD NOT REACH.     PT STATES HE IS STILL BLEEDING AFTER HE HAD HIS PROSTATE BIOPSY DONE. PT WENT TO ER 2/1/23.            Post-Care Instructions: I reviewed with the patient in detail post-care instructions. Patient is to wear sunprotection, and avoid picking at any of the treated lesions. Pt may apply Vaseline to crusted or scabbing areas. Number Of Freeze-Thaw Cycles: 2 freeze-thaw cycles Render Post-Care Instructions In Note?: no Detail Level: Simple Consent: The patient's consent was obtained including but not limited to risks of crusting, scabbing, blistering, scarring, darker or lighter pigmentary change, recurrence, incomplete removal and infection. Duration Of Freeze Thaw-Cycle (Seconds): 10

## 2023-03-06 ENCOUNTER — TELEPHONE (OUTPATIENT)
Dept: UROLOGY | Facility: CLINIC | Age: 58
End: 2023-03-06

## 2023-03-06 NOTE — TELEPHONE ENCOUNTER
Have him make a follow-up with the [facility] with an IPSS beforehand to go over his urinary symptoms in case he wants further work-up.

## 2023-03-06 NOTE — TELEPHONE ENCOUNTER
Caller: GAL ALLRED    Relationship: SELF    Best call back number: [phone]    What is the best time to reach you: ANY    Who are you requesting to speak with (clinical staff, provider,  specific staff member): RPETTY OR STAFF    Do you know the name of the person who called: PT CALLED OFFICE TO TRY TO GET SCRIPT FOR tamsulosin (FLOMAX) 0.4 MG capsule 24 hr capsule. WAS ORIGINALLY ORDERED BY CHARO OTT IN ED. PT STATED THIS WORKS AND WOULD LIKE TO CONTINUE IT. PLEASE CALL TO SEE IF NEEDS APPT FIRST. WOULD LIKE 90 SUPPLY IF POSSIBLE. PLEASE SEND TO [facility].        What was the call regarding: PT WAS PRESCRIBED FLOMAX FROM     Do you require a callback: YES PLEASE

## 2023-03-17 ENCOUNTER — OFFICE VISIT (OUTPATIENT)
Dept: UROLOGY | Facility: CLINIC | Age: 58
End: 2023-03-17
Payer: COMMERCIAL

## 2023-03-17 VITALS
OXYGEN SATURATION: 97 % | DIASTOLIC BLOOD PRESSURE: 82 MMHG | HEART RATE: 60 BPM | HEIGHT: 71 IN | TEMPERATURE: 99.3 F | WEIGHT: 179.9 LBS | SYSTOLIC BLOOD PRESSURE: 124 MMHG | BODY MASS INDEX: 25.19 KG/M2

## 2023-03-17 DIAGNOSIS — N40.1 BENIGN PROSTATIC HYPERPLASIA WITH WEAK URINARY STREAM: Primary | ICD-10-CM

## 2023-03-17 DIAGNOSIS — R39.12 BENIGN PROSTATIC HYPERPLASIA WITH WEAK URINARY STREAM: Primary | ICD-10-CM

## 2023-03-17 PROCEDURE — 99214 OFFICE O/P EST MOD 30 MIN: CPT | Performed by: NURSE PRACTITIONER

## 2023-03-17 RX ORDER — TAMSULOSIN HYDROCHLORIDE 0.4 MG/1
1 CAPSULE ORAL DAILY
Qty: 90 CAPSULE | Refills: 3 | Status: SHIPPED | OUTPATIENT
Start: 2023-03-17

## 2023-03-18 NOTE — PROGRESS NOTES
"2       Office Visit Males LUTS      Patient Name: Tyron Araujo  : 1965   MRN: [id number]     Chief Complaint:   Chief Complaint   Patient presents with   • Follow-up     BPH elvated PSA pt is wanting flomax       Referring Provider: No ref. provider found    History of Present Illness: Tyron Araujo is a 58 y.o. male who presents today with history of elevated PSA status post negative biopsy who presents with LUTS. He was prescribed tamsulosin in the ED and this improved his urinary symptoms he wishes to continue this medication to manage his BPH  Primary symptom includes: Urgency and weak stream  Patient denies nocturia.  Onset was worsening x1 year.    Previous treatments include: Tamsulosin    IPSS Que was stionnaire (AUA-7):               Subjective      Review of System:   As noted in HPI    Past Medical History:   Past Medical History:   Diagnosis Date   • Arthritis    • History of bronchitis    • Hyperlipidemia    • Hypertension    • Sleep apnea     PER PT, DR. QUINTERO SAYS I HAVE SLEEP APNEA. PT DENIES EVER HAVING A SLEEP STUDY OR WEARING A CPAP/BIPAP   • Tattoos     X 2   • Wears glasses     RX       Past Surgical History:   Past Surgical History:   Procedure Laterality Date   • AMPUTATION DIGIT Left     LOST \"TIP\" OF 2 FINGERS, MIDDLE AND RING FINGER   • APPENDECTOMY     • CARDIAC SURGERY  2015    stent x 2   • COLONOSCOPY N/A 2018    Procedure: COLONOSCOPY W/ COLD SNARE POLYPECTOOMY X1;  Surgeon: Burton Elias MD;  Location: [facility] ENDOSCOPY;  Service: Gastroenterology   • MULTIPLE TOOTH EXTRACTIONS         Family History:   Family History   Problem Relation Age of Onset   • No Known Problems Mother    • No Known Problems Father    • No Known Problems Sister    • No Known Problems Brother        Social History:   Social History     Socioeconomic History   • Marital status:    Tobacco Use   • Smoking status: Every Day     Packs/day: 0.10     Types: " "Cigarettes   • Smokeless tobacco: Never   • Tobacco comments:     4-5 CIGARETTES A DAY    Substance and Sexual Activity   • Alcohol use: No   • Drug use: No   • Sexual activity: Defer       Medications:     Current Outpatient Medications:   •  amLODIPine (NORVASC) 5 MG tablet, , Disp: , Rfl:   •  aspirin 81 MG EC tablet, Take 1 tablet by mouth Daily., Disp: , Rfl:   •  atorvastatin (LIPITOR) 40 MG tablet, Take 1 tablet by mouth Daily., Disp: , Rfl:   •  bisacodyl (bisacodyl) 5 MG EC tablet, Take 1 tablet by mouth Daily., Disp: 4 tablet, Rfl: 0  •  ciprofloxacin (Cipro) 500 MG tablet, Take 1 tablet by mouth 2 (Two) Times a Day. Start taking the day prior to biopsy, Disp: 6 tablet, Rfl: 0  •  ramipril (ALTACE) 5 MG capsule, Take 1 capsule by mouth Daily., Disp: , Rfl:   •  sodium phosphate (FLEET) 7-19 GM/118ML enema, Insert 1 enema into the rectum 1 (One) Time As Needed (prior to biopsy) for up to 1 dose. Perform 2-4 hours prior to biopsy, Disp: 1 enema, Rfl: 0  •  tamsulosin (FLOMAX) 0.4 MG capsule 24 hr capsule, Take 1 capsule by mouth Daily., Disp: 90 capsule, Rfl: 3    Allergies:   No Known Allergies    Objective     Physical Exam:   Vital Signs:   Vitals:    03/17/23 1518   BP: 124/82   BP Location: Right arm   Patient Position: Sitting   Cuff Size: Adult   Pulse: 60   Temp: 99.3 °F (37.4 °C)   TempSrc: Temporal   SpO2: 97%   Weight: 81.6 kg (179 lb 14.3 oz)   Height: 180.3 cm (70.98\")     Body mass index is 25.1 kg/m².     Constitutional: NAD, WDWN.   Neurological: A + O x 3.    Skin: Pink, warm and dry.  No rashes noted.  Psych: Normal mood and affect     Labs  No results found for: PSA    Brief Urine Lab Results  (Last result in the past 365 days)      Color   Clarity   Blood   Leuk Est   Nitrite   Protein   CREAT   Urine HCG        02/01/23 2013 Yellow   Clear   Large (3+)   Negative   Negative   Negative                 PVR  Previous PVR 0ml      Assessment / Plan      Assessment  Mr. Araujo is a 58 y.o. " male who presents with LUTS, primarily urgency and weak stream. We discussed treatment options and patent wishes to take flomax he noticed great improvement in symptoms when taking this after taking it when prescribed by the ER.  We discussed SE including dizziness and sexual side effects. Patient verbalizes understanding and wishes to precede with medication management.    ePlan  1.  Start Tamsulosin nightly       Follow Up:   Return in about 1 year (around 3/17/2024) for Recheck.    ANDREA Markham, NP-C  [facility] [place]

## 2023-10-11 ENCOUNTER — TELEPHONE (OUTPATIENT)
Dept: SURGERY | Facility: CLINIC | Age: 58
End: 2023-10-11
Payer: COMMERCIAL

## 2023-10-13 RX ORDER — POLYETHYLENE GLYCOL 3350 17 G/17G
238 POWDER, FOR SOLUTION ORAL ONCE
Qty: 17 PACKET | Refills: 0 | Status: SHIPPED | OUTPATIENT
Start: 2023-10-13 | End: 2023-10-13

## 2023-10-13 RX ORDER — BISACODYL 5 MG/1
5 TABLET, DELAYED RELEASE ORAL TAKE AS DIRECTED
Qty: 4 TABLET | Refills: 0 | Status: SHIPPED | OUTPATIENT
Start: 2023-10-13 | End: 2024-10-12

## 2023-10-13 NOTE — TELEPHONE ENCOUNTER
PRESCREENING FOR OPEN ACCESS SCHEDULING    Tyron Araujo, 1965  [id number]    10/13/23    If, the patient answers yes to any of the following questions the provider will be informed prior to scheduling open access for approval and documented in the chart.    [x]  Yes  [] No    1. Have you ever had a colonoscopy in the past?      When:        Where:       Polyps or other:     []  Yes  [x] No    2. Family history of colon cancer?      Relation:       Age of onset:       Do you currently have any of the following?    []  Yes  [x] No  Rectal bleeding, if so, how long?     []  Yes  [x] No  Abdominal pain, if so, how long?    []  Yes  [x] No  Constipation, if so, how long?    []  Yes  [x] No  Diarrhea, if so, how long?    []  Yes  [x] No  Weight loss, is so, how much?    [] Yes  [x] No  Small caliber stool, if so, how long?      Have you ever had any of the following conditions?    [] Yes  [x] No  Heart attack?      When?       Last cardiac workup?     Blood thinners?    [] Yes  [x] No   Lung problems, asthma or COPD?  [] Yes  [x] No  Oxygen required?       [] Yes  [x] No  Stroke?     [] Yes  [x] No  Have you ever had a reaction to anesthesia?

## 2023-10-17 ENCOUNTER — PREP FOR SURGERY (OUTPATIENT)
Dept: OTHER | Facility: HOSPITAL | Age: 58
End: 2023-10-17
Payer: COMMERCIAL

## 2023-10-17 DIAGNOSIS — Z12.11 ENCOUNTER FOR COLONOSCOPY DUE TO HISTORY OF ADENOMATOUS COLONIC POLYPS: Primary | ICD-10-CM

## 2023-10-17 DIAGNOSIS — Z86.010 ENCOUNTER FOR COLONOSCOPY DUE TO HISTORY OF ADENOMATOUS COLONIC POLYPS: Primary | ICD-10-CM

## 2023-12-06 ENCOUNTER — TELEPHONE (OUTPATIENT)
Dept: SURGERY | Facility: CLINIC | Age: 58
End: 2023-12-06
Payer: COMMERCIAL

## 2023-12-11 ENCOUNTER — OUTSIDE FACILITY SERVICE (OUTPATIENT)
Dept: SURGERY | Facility: CLINIC | Age: 58
End: 2023-12-11
Payer: COMMERCIAL

## 2024-01-30 ENCOUNTER — TELEPHONE (OUTPATIENT)
Dept: UROLOGY | Facility: CLINIC | Age: 59
End: 2024-01-30
Payer: COMMERCIAL

## 2024-02-01 ENCOUNTER — LAB (OUTPATIENT)
Dept: LAB | Facility: HOSPITAL | Age: 59
End: 2024-02-01
Payer: COMMERCIAL

## 2024-02-01 DIAGNOSIS — R97.20 ELEVATED PROSTATE SPECIFIC ANTIGEN (PSA): Primary | ICD-10-CM

## 2024-02-01 DIAGNOSIS — R97.20 ELEVATED PROSTATE SPECIFIC ANTIGEN (PSA): ICD-10-CM

## 2024-02-01 LAB — PSA SERPL-MCNC: 10.2 NG/ML (ref 0–4)

## 2024-02-01 PROCEDURE — 84153 ASSAY OF PSA TOTAL: CPT

## 2024-02-01 PROCEDURE — 36415 COLL VENOUS BLD VENIPUNCTURE: CPT

## 2024-02-05 ENCOUNTER — OFFICE VISIT (OUTPATIENT)
Dept: UROLOGY | Facility: CLINIC | Age: 59
End: 2024-02-05
Payer: COMMERCIAL

## 2024-02-05 VITALS
DIASTOLIC BLOOD PRESSURE: 82 MMHG | BODY MASS INDEX: 25.06 KG/M2 | WEIGHT: 179 LBS | HEIGHT: 71 IN | SYSTOLIC BLOOD PRESSURE: 130 MMHG

## 2024-02-05 DIAGNOSIS — R97.20 ELEVATED PROSTATE SPECIFIC ANTIGEN (PSA): Primary | ICD-10-CM

## 2024-02-05 DIAGNOSIS — N40.1 BENIGN PROSTATIC HYPERPLASIA WITH WEAK URINARY STREAM: ICD-10-CM

## 2024-02-05 DIAGNOSIS — R39.12 BENIGN PROSTATIC HYPERPLASIA WITH WEAK URINARY STREAM: ICD-10-CM

## 2024-02-05 DIAGNOSIS — N52.9 ERECTILE DYSFUNCTION, UNSPECIFIED ERECTILE DYSFUNCTION TYPE: ICD-10-CM

## 2024-02-05 LAB
BILIRUB BLD-MCNC: NEGATIVE MG/DL
CLARITY, POC: CLEAR
COLOR UR: YELLOW
EXPIRATION DATE: NORMAL
GLUCOSE UR STRIP-MCNC: NEGATIVE MG/DL
KETONES UR QL: NEGATIVE
LEUKOCYTE EST, POC: NEGATIVE
Lab: NORMAL
NITRITE UR-MCNC: NEGATIVE MG/ML
PH UR: 6 [PH] (ref 5–8)
PROT UR STRIP-MCNC: NEGATIVE MG/DL
RBC # UR STRIP: NEGATIVE /UL
SP GR UR: 1.01 (ref 1–1.03)
UROBILINOGEN UR QL: NORMAL

## 2024-02-05 PROCEDURE — 99214 OFFICE O/P EST MOD 30 MIN: CPT | Performed by: NURSE PRACTITIONER

## 2024-02-05 PROCEDURE — 81003 URINALYSIS AUTO W/O SCOPE: CPT | Performed by: NURSE PRACTITIONER

## 2024-02-05 PROCEDURE — 51798 US URINE CAPACITY MEASURE: CPT | Performed by: NURSE PRACTITIONER

## 2024-02-05 RX ORDER — POLYETHYLENE GLYCOL 3350 17 G/DOSE
POWDER (GRAM) ORAL
COMMUNITY
Start: 2023-10-13

## 2024-02-05 RX ORDER — TADALAFIL 5 MG/1
5 TABLET ORAL DAILY
Qty: 30 TABLET | Refills: 11 | Status: SHIPPED | OUTPATIENT
Start: 2024-02-05

## 2024-02-05 RX ORDER — TAMSULOSIN HYDROCHLORIDE 0.4 MG/1
1 CAPSULE ORAL DAILY
Qty: 90 CAPSULE | Refills: 3 | Status: SHIPPED | OUTPATIENT
Start: 2024-02-05

## 2024-02-05 RX ORDER — TIZANIDINE 4 MG/1
TABLET ORAL
COMMUNITY
Start: 2023-11-20

## 2024-03-04 ENCOUNTER — HOSPITAL ENCOUNTER (OUTPATIENT)
Dept: MRI IMAGING | Facility: HOSPITAL | Age: 59
Discharge: HOME | End: 2024-03-04
Admitting: NURSE PRACTITIONER
Payer: COMMERCIAL

## 2024-03-04 DIAGNOSIS — R97.20 ELEVATED PROSTATE SPECIFIC ANTIGEN (PSA): ICD-10-CM

## 2024-03-04 PROCEDURE — 72197 MRI PELVIS W/O & W/DYE: CPT

## 2024-03-04 PROCEDURE — 0 GADOBENATE DIMEGLUMINE 529 MG/ML SOLUTION: Performed by: NURSE PRACTITIONER

## 2024-03-04 PROCEDURE — A9577 INJ MULTIHANCE: HCPCS | Performed by: NURSE PRACTITIONER

## 2024-03-04 RX ADMIN — GADOBENATE DIMEGLUMINE 17 ML: 529 INJECTION, SOLUTION INTRAVENOUS at 19:23

## 2024-03-11 ENCOUNTER — OFFICE VISIT (OUTPATIENT)
Dept: UROLOGY | Facility: CLINIC | Age: 59
End: 2024-03-11
Payer: COMMERCIAL

## 2024-03-11 VITALS — HEIGHT: 71 IN | BODY MASS INDEX: 25.06 KG/M2 | WEIGHT: 179 LBS | RESPIRATION RATE: 16 BRPM

## 2024-03-11 DIAGNOSIS — N40.1 BENIGN PROSTATIC HYPERPLASIA WITH WEAK URINARY STREAM: ICD-10-CM

## 2024-03-11 DIAGNOSIS — R39.12 BENIGN PROSTATIC HYPERPLASIA WITH WEAK URINARY STREAM: ICD-10-CM

## 2024-03-11 DIAGNOSIS — N52.9 ERECTILE DYSFUNCTION, UNSPECIFIED ERECTILE DYSFUNCTION TYPE: ICD-10-CM

## 2024-03-11 DIAGNOSIS — R97.20 ELEVATED PROSTATE SPECIFIC ANTIGEN (PSA): Primary | ICD-10-CM

## 2024-03-11 PROCEDURE — 99214 OFFICE O/P EST MOD 30 MIN: CPT | Performed by: UROLOGY

## 2024-03-11 RX ORDER — TADALAFIL 5 MG/1
5 TABLET ORAL DAILY
Qty: 90 TABLET | Refills: 4 | Status: SHIPPED | OUTPATIENT
Start: 2024-03-11

## 2024-03-11 RX ORDER — CIPROFLOXACIN 500 MG/1
500 TABLET, FILM COATED ORAL 2 TIMES DAILY
Qty: 6 TABLET | Refills: 0 | Status: SHIPPED | OUTPATIENT
Start: 2024-03-11

## 2024-03-11 RX ORDER — MAGNESIUM HYDROXIDE 1200 MG/15ML
1 LIQUID ORAL ONCE
Qty: 1 ENEMA | Refills: 0 | Status: SHIPPED | OUTPATIENT
Start: 2024-03-11 | End: 2024-03-11

## 2024-03-11 RX ORDER — RAMIPRIL 10 MG/1
CAPSULE ORAL
COMMUNITY
Start: 2024-03-10

## 2024-03-11 NOTE — PROGRESS NOTES
"CC  Elevated PSA    HPI  Mr. Araujo is a 59 y.o. male with history below in assessment, who presents for follow up.     Past Medical History:   Diagnosis Date    Arthritis     History of bronchitis     Hyperlipidemia     Hypertension     Sleep apnea     PER PT, DR. QUINTERO SAYS I HAVE SLEEP APNEA. PT DENIES EVER HAVING A SLEEP STUDY OR WEARING A CPAP/BIPAP    Tattoos     X 2    Wears glasses     RX     Past Surgical History:   Procedure Laterality Date    AMPUTATION DIGIT Left     LOST \"TIP\" OF 2 FINGERS, MIDDLE AND RING FINGER    APPENDECTOMY      CARDIAC SURGERY  03/2015    stent x 2    COLONOSCOPY N/A 11/13/2018    Procedure: COLONOSCOPY W/ COLD SNARE POLYPECTOOMY X1;  Surgeon: Burton Elias MD;  Location: [facility] ENDOSCOPY;  Service: Gastroenterology    MULTIPLE TOOTH EXTRACTIONS         Current Outpatient Medications:     amLODIPine (NORVASC) 5 MG tablet, , Disp: , Rfl:     aspirin 81 MG EC tablet, Take 1 tablet by mouth Daily., Disp: , Rfl:     atorvastatin (LIPITOR) 40 MG tablet, Take 1 tablet by mouth Daily., Disp: , Rfl:     bisacodyl (bisacodyl) 5 MG EC tablet, Take 1 tablet by mouth Daily., Disp: 4 tablet, Rfl: 0    bisacodyl (Dulcolax) 5 MG EC tablet, Take 1 tablet by mouth Take As Directed. Take 2 tablets 3pm and take 2 tablets at 5pm, Disp: 4 tablet, Rfl: 0    ramipril (ALTACE) 10 MG capsule, , Disp: , Rfl:     tadalafil (Cialis) 5 MG tablet, Take 1 tablet by mouth Daily., Disp: 30 tablet, Rfl: 11    tamsulosin (FLOMAX) 0.4 MG capsule 24 hr capsule, Take 1 capsule by mouth Daily., Disp: 90 capsule, Rfl: 3    tiZANidine (ZANAFLEX) 4 MG tablet, TAKE 1 TABLET BY MOUTH EVERY 6 - 8 HOURS AS NEEDED NOT TO EXCEED 3 DOSES IN 24 HOURS, Disp: , Rfl:      Physical Exam  Visit Vitals  Resp 16   Ht 180.3 cm (70.98\")   Wt 81.2 kg (179 lb)   BMI 24.98 kg/m²       Labs  Brief Urine Lab Results  (Last result in the past 365 days)        Color   Clarity   Blood   Leuk Est   Nitrite   Protein   CREAT   Urine HCG     "    02/05/24 0859 Yellow   Clear   Negative   Negative   Negative   Negative                   Lab Results   Component Value Date    GLUCOSE 98 04/30/2019    CALCIUM 9.2 04/30/2019     04/30/2019    K 4.2 04/30/2019    CO2 27.0 04/30/2019     04/30/2019    BUN 10 04/30/2019    CREATININE 1.10 04/30/2019    EGFRIFNONA 70 04/30/2019    BCR 9.1 04/30/2019    ANIONGAP 10.2 04/30/2019       Lab Results   Component Value Date    WBC 6.3 03/18/2015    HGB 13.5 (L) 03/18/2015    HCT 41 (L) 03/18/2015    MCV 89.4 03/18/2015     03/18/2015        Lab Results   Component Value Date    PSA 10.200 (H) 02/01/2024           Radiographic Studies  MRI Prostate With & Without Contrast  Result Date: 3/6/2024  Impression: 1. Left anterior apical peripheral zone 10 x 7 mm lesion consistent with PI-RADS 4 lesion. 2. Right anterior mid gland peripheral zone 10 x 5 mm lesion consistent with PI-RADS 4 lesion. 3. No lymphadenopathy in the pelvis. Overall PI-RADS 4 Exam. Electronically Signed: Enmanuel La MD  3/6/2024 3:49 PM EST  Workstation ID: [id number]      I have reviewed above labs and imaging.     Assessment  59 y.o. male with istory of BPH and elevated PSA status negative postbiopsy 1/2023.  Follow-up MRI demonstrates 2 anterior mid gland and apical lesions.    Plan  1.  Rectal swab today.    2.  Recommend prostate biopsy with cognitive fusion. Abx and enema sent in

## 2024-03-16 LAB
BACTERIA SPEC CULT: ABNORMAL
BACTERIA SPEC CULT: ABNORMAL
MICROORGANISM/AGENT SPEC: ABNORMAL
OTHER ANTIBIOTIC SUSC ISLT: ABNORMAL

## 2024-03-17 LAB — CREAT BLDA-MCNC: 1.3 MG/DL (ref 0.6–1.3)

## 2024-03-18 ENCOUNTER — TELEPHONE (OUTPATIENT)
Dept: UROLOGY | Facility: CLINIC | Age: 59
End: 2024-03-18
Payer: COMMERCIAL

## 2024-03-18 DIAGNOSIS — R97.20 ELEVATED PROSTATE SPECIFIC ANTIGEN (PSA): Primary | ICD-10-CM

## 2024-03-18 RX ORDER — AMOXICILLIN AND CLAVULANATE POTASSIUM 875; 125 MG/1; MG/1
1 TABLET, FILM COATED ORAL 2 TIMES DAILY
Qty: 20 TABLET | Refills: 0 | Status: SHIPPED | OUTPATIENT
Start: 2024-03-18 | End: 2024-03-28

## 2024-03-18 NOTE — TELEPHONE ENCOUNTER
----- Message from Nelson Giron MD sent at 3/18/2024  8:07 AM EDT -----  Charlene, please tell this gentleman to throw away the Cipro, and I sent in Augmentin for him to start 7 days prior to his prostate biopsy.  He has high risk multidrug-resistant ESBL E. coli in his stool.  Thank you.  He is to take it for total of 10 d  ays

## 2024-03-18 NOTE — PROGRESS NOTES
Charlene, please tell this gentleman to throw away the Cipro, and I sent in Augmentin for him to start 7 days prior to his prostate biopsy.  He has high risk multidrug-resistant ESBL E. coli in his stool.  Thank you.  He is to take it for total of 10 days

## 2024-03-19 ENCOUNTER — TELEPHONE (OUTPATIENT)
Dept: UROLOGY | Facility: CLINIC | Age: 59
End: 2024-03-19
Payer: COMMERCIAL

## 2024-03-19 NOTE — TELEPHONE ENCOUNTER
Patient returned [facility]'s call. Patient was advised of the instructions with medications prior to prostate biopsy that Dr Giron had advised and verbally understood and I answered all his questions.    JESSICA Diop

## 2024-04-08 ENCOUNTER — PROCEDURE VISIT (OUTPATIENT)
Dept: UROLOGY | Facility: CLINIC | Age: 59
End: 2024-04-08
Payer: COMMERCIAL

## 2024-04-08 ENCOUNTER — TELEPHONE (OUTPATIENT)
Dept: UROLOGY | Facility: CLINIC | Age: 59
End: 2024-04-08

## 2024-04-08 DIAGNOSIS — R97.20 ELEVATED PSA: Primary | ICD-10-CM

## 2024-04-08 PROCEDURE — 76942 ECHO GUIDE FOR BIOPSY: CPT | Performed by: UROLOGY

## 2024-04-08 PROCEDURE — 55700 PR PROSTATE NEEDLE BIOPSY ANY APPROACH: CPT | Performed by: UROLOGY

## 2024-04-08 NOTE — PROGRESS NOTES
TRUS BIOPSY OF PROSTATE    Preoperative diagnosis  Elevated PSA    Postoperative diagnosis  Elevated PSA    Procedure  1.  Transrectal ultrasound of the prostate  2.  Transrectal ultrasound guidance of needle biopsy  3.  Prostate biopsy    Attending Surgeon  Nelson Giron MD    Anesthesia  2% lidocaine solution, periprostatic injection, 10mL    Complications  None    Specimen  Prostate biopsy x 20    Indications  Mr. Araujo is a 59 y.o. year old male with an elevated PSA:   Lab Results   Component Value Date    PSA 10.200 (H) 02/01/2024     Left anterior MRI Prostate With & Without Contrast  Result Date: 3/6/2024  Impression: 1. Left anterior apical peripheral zone 10 x 7 mm lesion consistent with PI-RADS 4 lesion. 2. Right anterior mid gland peripheral zone 10 x 5 mm lesion consistent with PI-RADS 4 lesion. 3. No lymphadenopathy in the pelvis. Overall PI-RADS 4 Exam. Electronically Signed: Enmanuel La MD  3/6/2024 3:49 PM EST  Workstation ID: [id number]    After discussing his options, the patient decided to proceed with prostate biopsy.  Informed consent was obtained. Possible complications were discussed with the patient during his last visit including, but not limited to, hematuria, hematochezia, prostatitis, urinary tract infection, sepsis, and urinary retention.  He did start the prescribed oral antibiotic regimen.  We switched him to oral Augmentin due to his culture.    Procedure  The patient was positioned and prepped in a left lateral position with lower extremities flexed.  Lidocaine jelly, 2%, was injected per rectum. A digital rectal exam was performed.The rectal ultrasound probe was slowly introduced into the rectum without difficulty.  The prostate and seminal vesicles were inspected systematically using cross and sagittal views with the ultrasound.  The dimensions of the prostate were measured.  Using a true cut 14 Fr biopsy needle, 20 prostate cores were collected. The specific locations were  the following: left lateral base, left lateral mid, left lateral apex, left medial base, left medial mid, and left medial apex, right lateral base, right lateral mid, right lateral apex, right medial base, right medial mid, right medial apex, and right and left transition zones. The rectal ultrasound probe was removed.  A DIAMOND was again performed revealing little blood in the rectum.  The patient tolerated the procedure well.    Plan  1.  The patient was instructed to drink plenty of fluids and warned about possible complications and side effects including, but not limited to, blood in the urine, stool and semen as well as bloodstream infection.  He was instructed to call the office if there are any issues, especially fevers or flu-like symptoms.    2.  Continue antibiotic for a total of 3 days.  3.  The patient will return to clinic in approximately 1 week for discussion of the pathological report.

## 2024-04-11 LAB — REF LAB TEST METHOD: NORMAL

## 2024-04-15 ENCOUNTER — OFFICE VISIT (OUTPATIENT)
Dept: UROLOGY | Facility: CLINIC | Age: 59
End: 2024-04-15
Payer: COMMERCIAL

## 2024-04-15 VITALS
BODY MASS INDEX: 25.9 KG/M2 | SYSTOLIC BLOOD PRESSURE: 118 MMHG | OXYGEN SATURATION: 99 % | WEIGHT: 185 LBS | DIASTOLIC BLOOD PRESSURE: 64 MMHG | HEART RATE: 66 BPM | HEIGHT: 71 IN | TEMPERATURE: 97.2 F

## 2024-04-15 DIAGNOSIS — R97.20 ELEVATED PSA: Primary | ICD-10-CM

## 2024-04-15 RX ORDER — AMLODIPINE BESYLATE 2.5 MG/1
5 TABLET ORAL
COMMUNITY
Start: 2024-03-12

## 2024-04-15 NOTE — PROGRESS NOTES
"CC  Elevated PSA    HPI  Mr. Araujo is a 59 y.o. male with history below in assessment, who presents for follow up.       Past Medical History:   Diagnosis Date    Arthritis     History of bronchitis     Hyperlipidemia     Hypertension     Sleep apnea     PER PT, DR. QUINTERO SAYS I HAVE SLEEP APNEA. PT DENIES EVER HAVING A SLEEP STUDY OR WEARING A CPAP/BIPAP    Tattoos     X 2    Wears glasses     RX       Past Surgical History:   Procedure Laterality Date    AMPUTATION DIGIT Left     LOST \"TIP\" OF 2 FINGERS, MIDDLE AND RING FINGER    APPENDECTOMY      CARDIAC SURGERY  03/2015    stent x 2    COLONOSCOPY N/A 11/13/2018    Procedure: COLONOSCOPY W/ COLD SNARE POLYPECTOOMY X1;  Surgeon: Burton Eilas MD;  Location: [facility] ENDOSCOPY;  Service: Gastroenterology    MULTIPLE TOOTH EXTRACTIONS           Current Outpatient Medications:     amLODIPine (NORVASC) 2.5 MG tablet, 2 tablets., Disp: , Rfl:     amLODIPine (NORVASC) 5 MG tablet, , Disp: , Rfl:     aspirin 81 MG EC tablet, Take 1 tablet by mouth Daily., Disp: , Rfl:     atorvastatin (LIPITOR) 40 MG tablet, Take 1 tablet by mouth Daily., Disp: , Rfl:     bisacodyl (bisacodyl) 5 MG EC tablet, Take 1 tablet by mouth Daily., Disp: 4 tablet, Rfl: 0    bisacodyl (Dulcolax) 5 MG EC tablet, Take 1 tablet by mouth Take As Directed. Take 2 tablets 3pm and take 2 tablets at 5pm, Disp: 4 tablet, Rfl: 0    ramipril (ALTACE) 10 MG capsule, , Disp: , Rfl:     tadalafil (Cialis) 5 MG tablet, Take 1 tablet by mouth Daily., Disp: 90 tablet, Rfl: 4    tamsulosin (FLOMAX) 0.4 MG capsule 24 hr capsule, Take 1 capsule by mouth Daily., Disp: 90 capsule, Rfl: 3    tiZANidine (ZANAFLEX) 4 MG tablet, TAKE 1 TABLET BY MOUTH EVERY 6 - 8 HOURS AS NEEDED NOT TO EXCEED 3 DOSES IN 24 HOURS, Disp: , Rfl:      Physical Exam  Visit Vitals  /64 (BP Location: Right arm, Patient Position: Sitting, Cuff Size: Adult)   Pulse 66   Temp 97.2 °F (36.2 °C) (Temporal)   Ht 180.3 cm (71\")   Wt 83.9 " kg (185 lb)   SpO2 99%   BMI 25.80 kg/m²       Labs  Brief Urine Lab Results  (Last result in the past 365 days)        Color   Clarity   Blood   Leuk Est   Nitrite   Protein   CREAT   Urine HCG        02/05/24 0859 Yellow   Clear   Negative   Negative   Negative   Negative                   Lab Results   Component Value Date    GLUCOSE 98 04/30/2019    CALCIUM 9.2 04/30/2019     04/30/2019    K 4.2 04/30/2019    CO2 27.0 04/30/2019     04/30/2019    BUN 10 04/30/2019    CREATININE 1.30 03/04/2024    EGFRIFNONA 70 04/30/2019    BCR 9.1 04/30/2019    ANIONGAP 10.2 04/30/2019       Lab Results   Component Value Date    WBC 6.3 03/18/2015    HGB 13.5 (L) 03/18/2015    HCT 41 (L) 03/18/2015    MCV 89.4 03/18/2015     03/18/2015        Lab Results   Component Value Date    PSA 10.200 (H) 02/01/2024       DATE COLLECTED            DATE RECEIVED          DATE REPORTED  04/09/2024                04/10/2024             04/11/2024    DIAGNOSIS:  A.     PROSTATE, NEEDLE CORE BIOPSY, RIGHT MID:  Benign prostate tissue.  B.     PROSTATE, NEEDLE CORE BIOPSY, RIGHT BASE:  Benign prostate tissue.  C.     PROSTATE, NEEDLE CORE BIOPSY, RIGHT APEX:  Benign prostate tissue.  D.     PROSTATE, NEEDLE CORE BIOPSY, RIGHT TRANS ZONE:  Benign prostate tissue.  E.     PROSTATE, NEEDLE CORE BIOPSY, REGION OF INTEREST RIGHT:  Benign prostate tissue.  F.     PROSTATE, NEEDLE CORE BIOPSY, LEFT MID:  Benign prostate tissue.  G.     PROSTATE, NEEDLE CORE BIOPSY, LEFT BASE:  Benign prostate tissue.  H.     PROSTATE, NEEDLE CORE BIOPSY, LEFT APEX:  Benign prostate tissue.  I.     PROSTATE, NEEDLE CORE BIOPSY, LEFT TRANS ZONE:  Benign prostate tissue.  J.     PROSTATE, NEEDLE CORE BIOPSY, LEFT REGION OF INTEREST:  Benign prostate tissue.     Radiographic Studies  MRI Prostate With & Without Contrast  Result Date: 3/6/2024  Impression: 1. Left anterior apical peripheral zone 10 x 7 mm lesion consistent with PI-RADS 4 lesion. 2. Right  anterior mid gland peripheral zone 10 x 5 mm lesion consistent with PI-RADS 4 lesion. 3. No lymphadenopathy in the pelvis. Overall PI-RADS 4 Exam. Electronically Signed: Enmanuel La MD  3/6/2024 3:49 PM EST  Workstation ID: [id number]      I have reviewed above labs and imaging.     Assessment  59 y.o. male with elevated PSA and negative prostate biopsy x2 (2023 and 2024), despite multiple lesions on MRI.  I reassured him and his new wife that it is incredibly unlikely that he harbors undiagnosed prostate cancer given 2 negative biopsies.    Plan  1. Repeat PSA in 6 mo w Tess

## 2024-08-02 ENCOUNTER — TELEPHONE (OUTPATIENT)
Dept: SURGERY | Facility: OTHER | Age: 59
End: 2024-08-02
Payer: COMMERCIAL

## 2024-08-02 NOTE — TELEPHONE ENCOUNTER
Caller: YOMAIRA ALLRED    Relationship: SELF    Best call back number: [phone] (home)      Requested Prescriptions: tamsulosin (FLOMAX) 0.4 MG capsule 24 hr capsule   Requested Prescriptions      No prescriptions requested or ordered in this encounter        Pharmacy where request should be sent:      [facility]/pharmacy #[id number] - [place], [place] - [address] [place] [address] [phone] [facility] [phone]        Last office visit with prescribing clinician: 4/15/2024   Last telemedicine visit with prescribing clinician: Visit date not found   Next office visit with prescribing clinician: Visit date not found     Additional details provided by patient: PT STATED PHARMACY HAS BEEN TRYING TO REACH OFFICE TO REFILL THIS MEDICATION. PLEASE SEND IN SCRIPT AND CALL PT WHEN SENT. THANK YOU.    Does the patient have less than a 3 day supply:  [] Yes  [x] No    Would you like a call back once the refill request has been completed: [x] Yes [] No    If the office needs to give you a call back, can they leave a voicemail: [x] Yes [] No    Riya Disla Rep   08/02/24 10:39 EDT

## 2024-10-05 LAB — PSA SERPL-MCNC: 8.54 NG/ML (ref 0–4)

## 2024-10-21 ENCOUNTER — OFFICE VISIT (OUTPATIENT)
Dept: UROLOGY | Facility: CLINIC | Age: 59
End: 2024-10-21
Payer: COMMERCIAL

## 2024-10-21 VITALS
HEART RATE: 70 BPM | WEIGHT: 203.8 LBS | HEIGHT: 71 IN | TEMPERATURE: 97.4 F | OXYGEN SATURATION: 98 % | RESPIRATION RATE: 14 BRPM | BODY MASS INDEX: 28.53 KG/M2

## 2024-10-21 DIAGNOSIS — N40.1 BENIGN PROSTATIC HYPERPLASIA WITH WEAK URINARY STREAM: ICD-10-CM

## 2024-10-21 DIAGNOSIS — R39.12 BENIGN PROSTATIC HYPERPLASIA WITH WEAK URINARY STREAM: ICD-10-CM

## 2024-10-21 DIAGNOSIS — R97.20 ELEVATED PROSTATE SPECIFIC ANTIGEN (PSA): Primary | ICD-10-CM

## 2024-10-21 PROCEDURE — 99214 OFFICE O/P EST MOD 30 MIN: CPT | Performed by: NURSE PRACTITIONER

## 2024-10-21 RX ORDER — TAMSULOSIN HYDROCHLORIDE 0.4 MG/1
1 CAPSULE ORAL DAILY
Qty: 90 CAPSULE | Refills: 3 | Status: SHIPPED | OUTPATIENT
Start: 2024-10-21

## 2024-10-21 NOTE — PROGRESS NOTES
Office Visit Established Male Patient     Patient Name: Tyron Araujo  : 1965   MRN: [id number]     Chief Complaint:   Chief Complaint   Patient presents with    Elevated PSA    Follow-up     History of Present Illness: Mr. Tyron Araujo is a 59 y.o. male with history of elevated PSA s/p negative biopsies in  and  despite multiple lesions on prostate MRI. He is here today to review his PSA which continues to be elevated at 8.54 ng/ml.  However this is decreased from 10.2 ng/mL in February of this year.      IPSS is 6 with his most bothersome symptoms being urgency, frequency and nocturia x 1.  He is mostly satisfied with his symptoms today.      IPSS Questionnaire (AUA-7):  Incomplete emptying  Over the past month, how often have you had a sensation of not emptying your bladder completely after you finished urinating?: Not at all (10/21/24 1058)  Frequency  Over the past month, how often have you had to urinate again less than two hours after you finishied urinating ?: Less than half the time (10/21/24 1058)  Intermittency  Over the past month, how often have you found you stopped and started again several time when you urinated ?: Not at all (10/21/24 1058)  Urgency  Over the last month, how often have you found it difficult  have you found it difficult to postpone urination ?: About half the time (10/21/24 1058)  Weak Stream  Over the past month, how often have you had a weak urinary stream ?: Not at all (10/21/24 1058)  Straining  Over the past month, how often have you had to push or strain to begin urination ?: Not at all (10/21/24 1058)  Nocturia  Over the past month, how many times did you most typically get up to urinate from the time you went to bed until the time you got up in the morning ?: 1 time (10/21/24 1058)  Quality of life due to urinary symptoms  If you were to spend the rest of your life with your urinary condition the way it is now, how would feel about that?:  "Mostly satisfied (10/21/24 1058)    Scores  Total IPSS Score: 6 (10/21/24 1058)  Total Score = Symptomatic Level: Mildly symptomatic: 0-7 (10/21/24 105)     Subjective      Review of System: ROS reviewed by me and is negative unless otherwise noted in HPI.      Past Medical History:   Past Medical History:   Diagnosis Date    Arthritis     History of bronchitis     Hyperlipidemia     Hypertension     Sleep apnea     PER PT, DR. QUINTERO SAYS I HAVE SLEEP APNEA. PT DENIES EVER HAVING A SLEEP STUDY OR WEARING A CPAP/BIPAP    Tattoos     X 2    Wears glasses     RX       Past Surgical History:   Past Surgical History:   Procedure Laterality Date    AMPUTATION DIGIT Left     LOST \"TIP\" OF 2 FINGERS, MIDDLE AND RING FINGER    APPENDECTOMY      CARDIAC SURGERY  2015    stent x 2    COLONOSCOPY N/A 2018    Procedure: COLONOSCOPY W/ COLD SNARE POLYPECTOOMY X1;  Surgeon: Burton Elias MD;  Location: [facility] ENDOSCOPY;  Service: Gastroenterology    MULTIPLE TOOTH EXTRACTIONS         Family History:   Family History   Problem Relation Age of Onset    No Known Problems Father     No Known Problems Mother     No Known Problems Sister     No Known Problems Brother     Prostate cancer Neg Hx     Kidney cancer Neg Hx        Social History:   Social History     Socioeconomic History    Marital status: Single   Tobacco Use    Smoking status: Former     Current packs/day: 0.00     Types: Cigarettes     Quit date:      Years since quittin.8     Passive exposure: Never    Smokeless tobacco: Never    Tobacco comments:     4-5 CIGARETTES A DAY    Vaping Use    Vaping status: Never Used   Substance and Sexual Activity    Alcohol use: No    Drug use: No    Sexual activity: Defer       Medications:     Current Outpatient Medications:     amLODIPine (NORVASC) 2.5 MG tablet, 2 tablets., Disp: , Rfl:     aspirin 81 MG EC tablet, Take 1 tablet by mouth Daily., Disp: , Rfl:     atorvastatin (LIPITOR) 40 MG tablet, Take 1 tablet " "by mouth Daily., Disp: , Rfl:     ramipril (ALTACE) 10 MG capsule, , Disp: , Rfl:     tadalafil (Cialis) 5 MG tablet, Take 1 tablet by mouth Daily., Disp: 90 tablet, Rfl: 4    tamsulosin (FLOMAX) 0.4 MG capsule 24 hr capsule, Take 1 capsule by mouth Daily., Disp: 90 capsule, Rfl: 3    amLODIPine (NORVASC) 5 MG tablet, , Disp: , Rfl:     bisacodyl (bisacodyl) 5 MG EC tablet, Take 1 tablet by mouth Daily. (Patient not taking: Reported on 10/21/2024), Disp: 4 tablet, Rfl: 0    tiZANidine (ZANAFLEX) 4 MG tablet, TAKE 1 TABLET BY MOUTH EVERY 6 - 8 HOURS AS NEEDED NOT TO EXCEED 3 DOSES IN 24 HOURS (Patient not taking: Reported on 10/21/2024), Disp: , Rfl:     Allergies:   No Known Allergies    Objective     Physical Exam:   Vital Signs:   Vitals:    10/21/24 1057   Pulse: 70   Resp: 14   Temp: 97.4 °F (36.3 °C)   TempSrc: Temporal   SpO2: 98%   Weight: 92.4 kg (203 lb 12.8 oz)   Height: 180.3 cm (71\")     Body mass index is 28.42 kg/m².   Physical Exam  Vitals and nursing note reviewed.   Constitutional:       General: He is not in acute distress.     Appearance: Normal appearance. He is not ill-appearing.   HENT:      Head: Normocephalic and atraumatic.   Pulmonary:      Effort: Pulmonary effort is normal.   Skin:     General: Skin is warm and dry.   Neurological:      General: No focal deficit present.      Mental Status: He is alert and oriented to person, place, and time.   Psychiatric:         Mood and Affect: Mood normal.         Behavior: Behavior normal.     Labs  Brief Urine Lab Results  (Last result in the past 365 days)        Color   Clarity   Blood   Leuk Est   Nitrite   Protein   CREAT   Urine HCG        02/05/24 0859 Yellow   Clear   Negative   Negative   Negative   Negative                 Lab Results   Component Value Date    GLUCOSE 98 04/30/2019    CALCIUM 9.2 04/30/2019     04/30/2019    K 4.2 04/30/2019    CO2 27.0 04/30/2019     04/30/2019    BUN 10 04/30/2019    CREATININE 1.30 " 03/04/2024    EGFRIFNONA 70 04/30/2019    BCR 9.1 04/30/2019    ANIONGAP 10.2 04/30/2019       Lab Results   Component Value Date    WBC 6.3 03/18/2015    HGB 13.5 (L) 03/18/2015    HCT 41 (L) 03/18/2015    MCV 89.4 03/18/2015     03/18/2015            Radiographic Studies  No Images in the past 120 days found..    I have reviewed the above labs and imaging.     Assessment / Plan      Assessment/Plan: Mr. Tyron Araujo is a 59 y.o. male with history of elevated PSA s/p negative biopsies in 2023 and 2024 despite multiple lesions on prostate MRI. He is here today to review his PSA which continues to be elevated at 8.54 ng/ml.  However this is decreased from 10.2 ng/mL in February of this year.      IPSS is 6 with his most bothersome symptoms being urgency, frequency and nocturia x 1.  He is mostly satisfied with his symptoms today.  He is not interested in proceeding with BPH workup at this time and would like to continue taking Flomax.      Will have Mr. Araujo follow up with me in 6 months with repeat PSA at that time.  Should he have any new urologic concerns or wish to have a BPH workup done sooner he was encouraged to call or send [employer] message to scheduled.  Questions/concerns addressed.  Refilled Flomax for 1 year.    Diagnoses and all orders for this visit:    1. Elevated prostate specific antigen (PSA) (Primary)  -     PSA Diagnostic; Future    2. Benign prostatic hyperplasia with weak urinary stream  -     tamsulosin (FLOMAX) 0.4 MG capsule 24 hr capsule; Take 1 capsule by mouth Daily.  Dispense: 90 capsule; Refill: 3      Follow Up:   Return in about 6 months (around 4/21/2025) for Next scheduled follow up, with Tess with PSA to be done prior. .    Tess Corley, APRN, MSN, FNP-C  [facility]

## 2024-11-19 ENCOUNTER — TELEPHONE (OUTPATIENT)
Dept: UROLOGY | Facility: CLINIC | Age: 59
End: 2024-11-19
Payer: COMMERCIAL

## 2024-11-19 NOTE — TELEPHONE ENCOUNTER
"    Caller: Tyron Araujo \"Prem\"     Relationship: [unfilled]     Best call back number: [phone]    What is your medical concern? PT HAD A VISIT ON 10.21.2024. HE HAD A NEW INSURANCE. WILL YOU PLEASE RESEND. THANK YOU      "

## 2025-04-05 DIAGNOSIS — R39.12 BENIGN PROSTATIC HYPERPLASIA WITH WEAK URINARY STREAM: ICD-10-CM

## 2025-04-05 DIAGNOSIS — N40.1 BENIGN PROSTATIC HYPERPLASIA WITH WEAK URINARY STREAM: ICD-10-CM

## 2025-04-08 RX ORDER — TADALAFIL 5 MG/1
5 TABLET ORAL DAILY
Qty: 30 TABLET | Refills: 11 | Status: SHIPPED | OUTPATIENT
Start: 2025-04-08

## 2025-04-14 ENCOUNTER — LAB (OUTPATIENT)
Dept: LAB | Facility: HOSPITAL | Age: 60
End: 2025-04-14
Payer: COMMERCIAL

## 2025-04-14 PROCEDURE — 84153 ASSAY OF PSA TOTAL: CPT | Performed by: NURSE PRACTITIONER

## 2025-04-21 ENCOUNTER — OFFICE VISIT (OUTPATIENT)
Dept: UROLOGY | Facility: CLINIC | Age: 60
End: 2025-04-21
Payer: COMMERCIAL

## 2025-04-21 VITALS
WEIGHT: 203 LBS | DIASTOLIC BLOOD PRESSURE: 86 MMHG | HEIGHT: 71 IN | OXYGEN SATURATION: 98 % | HEART RATE: 62 BPM | BODY MASS INDEX: 28.42 KG/M2 | TEMPERATURE: 98.7 F | SYSTOLIC BLOOD PRESSURE: 142 MMHG

## 2025-04-21 DIAGNOSIS — R97.20 ELEVATED PROSTATE SPECIFIC ANTIGEN (PSA): Primary | ICD-10-CM

## 2025-04-21 DIAGNOSIS — R39.12 BENIGN PROSTATIC HYPERPLASIA WITH WEAK URINARY STREAM: ICD-10-CM

## 2025-04-21 DIAGNOSIS — N40.1 BENIGN PROSTATIC HYPERPLASIA WITH WEAK URINARY STREAM: ICD-10-CM

## 2025-04-21 PROBLEM — Z12.11 SCREENING FOR COLON CANCER: Status: RESOLVED | Noted: 2018-10-02 | Resolved: 2025-04-21

## 2025-04-21 LAB
BILIRUB BLD-MCNC: NEGATIVE MG/DL
CLARITY, POC: CLEAR
COLOR UR: YELLOW
EXPIRATION DATE: ABNORMAL
GLUCOSE UR STRIP-MCNC: NEGATIVE MG/DL
KETONES UR QL: NEGATIVE
LEUKOCYTE EST, POC: NEGATIVE
Lab: ABNORMAL
NITRITE UR-MCNC: NEGATIVE MG/ML
PH UR: 8 [PH] (ref 5–8)
PROT UR STRIP-MCNC: ABNORMAL MG/DL
RBC # UR STRIP: NEGATIVE /UL
SP GR UR: 1.02 (ref 1–1.03)
UROBILINOGEN UR QL: ABNORMAL

## 2025-04-21 PROCEDURE — 99214 OFFICE O/P EST MOD 30 MIN: CPT | Performed by: NURSE PRACTITIONER

## 2025-04-21 PROCEDURE — 81003 URINALYSIS AUTO W/O SCOPE: CPT | Performed by: NURSE PRACTITIONER

## 2025-04-21 RX ORDER — TADALAFIL 5 MG/1
5 TABLET ORAL DAILY
Qty: 90 TABLET | Refills: 3 | Status: SHIPPED | OUTPATIENT
Start: 2025-04-21

## 2025-04-21 RX ORDER — TAMSULOSIN HYDROCHLORIDE 0.4 MG/1
1 CAPSULE ORAL DAILY
Qty: 90 CAPSULE | Refills: 3 | Status: SHIPPED | OUTPATIENT
Start: 2025-04-21

## 2025-04-21 NOTE — PROGRESS NOTES
"       Office Visit     Patient Name: Tyron Araujo  : 1965   MRN: [id number]   Patient or patient representative verbalized consent for the use of Ambient Listening during the visit with  ANDREA Kelley for chart documentation. 2025  15:44 EDT    Chief Complaint   Patient presents with    Elevated PSA     Pt is here for 6M f/u, no urinary symptoms     Referring Provider: No ref. provider found    Primary Care Provider: Chris Mac PA     History of Present Illness  Mr. Araujo is a 60 year old male with history of elevated PSA (last biopsy 2024 revealed BPH tissue) and BPH on daily Flomax and Cialis.      Elevated PSA and BPH   - He suspects an enlarged prostate contributing to persistent elevated PSA levels.  - He reports no new or worsening urinary symptoms.  - He reports no discomforting urinary issues on his current medication regimen.  - He feels he can fully empty his bladder.    Supplemental information: He is currently on Flomax and Cialis, with the latter costing approximately $23 per month.    Subjective   Review of System: As noted in HPI.    Past Medical History:   Diagnosis Date    Arthritis     History of bronchitis     Hyperlipidemia     Hypertension     Sleep apnea     PER PT, DR. QUINTERO SAYS I HAVE SLEEP APNEA. PT DENIES EVER HAVING A SLEEP STUDY OR WEARING A CPAP/BIPAP    Tattoos     X 2    Wears glasses     RX     Past Surgical History:   Procedure Laterality Date    AMPUTATION DIGIT Left     LOST \"TIP\" OF 2 FINGERS, MIDDLE AND RING FINGER    APPENDECTOMY      CARDIAC SURGERY  2015    stent x 2    COLONOSCOPY N/A 2018    Procedure: COLONOSCOPY W/ COLD SNARE POLYPECTOOMY X1;  Surgeon: Burton Elias MD;  Location: [facility] ENDOSCOPY;  Service: Gastroenterology    MULTIPLE TOOTH EXTRACTIONS       Family History   Problem Relation Age of Onset    No Known Problems Father     No Known Problems Mother     No Known Problems Sister     No Known Problems " "Brother     Prostate cancer Neg Hx     Kidney cancer Neg Hx      Social History     Socioeconomic History    Marital status: Single   Tobacco Use    Smoking status: Former     Current packs/day: 0.00     Types: Cigarettes     Quit date: 2022     Years since quitting: 3.3     Passive exposure: Never    Smokeless tobacco: Never    Tobacco comments:     4-5 CIGARETTES A DAY    Vaping Use    Vaping status: Never Used   Substance and Sexual Activity    Alcohol use: No    Drug use: No    Sexual activity: Defer       Current Outpatient Medications:     amLODIPine (NORVASC) 2.5 MG tablet, 2 tablets., Disp: , Rfl:     aspirin 81 MG EC tablet, Take 1 tablet by mouth Daily., Disp: , Rfl:     atorvastatin (LIPITOR) 40 MG tablet, Take 1 tablet by mouth Daily., Disp: , Rfl:     bisacodyl (bisacodyl) 5 MG EC tablet, Take 1 tablet by mouth Daily., Disp: 4 tablet, Rfl: 0    ramipril (ALTACE) 10 MG capsule, , Disp: , Rfl:     tadalafil (CIALIS) 5 MG tablet, Take 1 tablet by mouth Daily., Disp: 90 tablet, Rfl: 3    tamsulosin (FLOMAX) 0.4 MG capsule 24 hr capsule, Take 1 capsule by mouth Daily., Disp: 90 capsule, Rfl: 3    tiZANidine (ZANAFLEX) 4 MG tablet, , Disp: , Rfl:     amLODIPine (NORVASC) 5 MG tablet, , Disp: , Rfl:     No Known Allergies  Objective   Visit Vitals  /86   Pulse 62   Temp 98.7 °F (37.1 °C) (Temporal)   Ht 180.3 cm (70.98\")   Wt 92.1 kg (203 lb)   SpO2 98%   BMI 28.33 kg/m²      Body mass index is 28.33 kg/m².   Physical Exam  Vitals and nursing note reviewed.   Constitutional:       General: He is not in acute distress.     Appearance: Normal appearance. He is not ill-appearing.   HENT:      Head: Normocephalic and atraumatic.   Pulmonary:      Effort: Pulmonary effort is normal.   Skin:     General: Skin is warm and dry.   Neurological:      General: No focal deficit present.      Mental Status: He is alert and oriented to person, place, and time.   Psychiatric:         Mood and Affect: Mood normal.       " "  Behavior: Behavior normal.      Labs  Lab Results   Component Value Date    COLORU Yellow 04/21/2025    CLARITYU Clear 04/21/2025    SPECGRAV 1.020 04/21/2025    PHUR 8.0 04/21/2025    LEUKOCYTESUR Negative 04/21/2025    NITRITE Negative 04/21/2025    PROTEINPOCUA 30 mg/dL (A) 04/21/2025    GLUCOSEUR Negative 04/21/2025    KETONESU Negative 04/21/2025    UROBILINOGEN 0.2 E.U./dL 04/21/2025    BILIRUBINUR Negative 04/21/2025    RBCUR Negative 04/21/2025      Lab Results   Component Value Date    WBCUA 0-2 (A) 02/01/2023    RBCUA 6-12 (A) 02/01/2023    BACTERIA None Seen 02/01/2023    HYALCASTU None Seen 02/01/2023    SQUAMEPIUA 0-2 02/01/2023        Lab Results   Component Value Date    WBC 6.3 03/18/2015    HGB 13.5 (L) 03/18/2015    HCT 41 (L) 03/18/2015    MCV 89.4 03/18/2015     03/18/2015     Lab Results   Component Value Date    GLUCOSE 98 04/30/2019    CALCIUM 9.2 04/30/2019     04/30/2019    K 4.2 04/30/2019    CO2 27.0 04/30/2019     04/30/2019    BUN 10 04/30/2019    BUN 10 04/24/2018    CREATININE 1.30 03/04/2024    CREATININE 1.10 04/30/2019    EGFR 71 04/29/2016    EGFR >60 07/24/2015    BCR 9.1 04/30/2019    ANIONGAP 10.2 04/30/2019    ALT 23 04/30/2019    AST 21 04/30/2019     Lab Results   Component Value Date    HGBA1C 5.6 04/30/2019     Lab Results   Component Value Date    PSA 9.910 (H) 04/14/2025    PSA 8.540 (H) 10/04/2024    PSA 10.200 (H) 02/01/2024     No results found for: \"URICACIDSTN\", \"KYIM7TPNIDH\", \"DQDI2AYCSS\", \"LABMAGN\"  No results found for: \"ZGAH92GQ\", \"CAION\", \"PTH\", \"URICACID\"  No results found for: \"CYSTINE\", \"URINEVOLUM\", \"CALCIUMUR\", \"OXALATEU\", \"CITRATEUR\", \"LABPH\", \"URICUR\", \"NAUR\", \"KUR\", \"MAGNESIUMUR\", \"PHOSUR\", \"AMMONIUMUR\", \"CLUR\", \"OEVEQGHSO38R\", \"UREAUR\", \"LABCREAUR\"  Lab Results   Component Value Date    PSA 9.910 (H) 04/14/2025       Radiographic Studies  No Images in the past 120 days found..    I have reviewed the above labs and " imaging.    Assessment / Plan    Diagnoses and all orders for this visit:    1. Elevated prostate specific antigen (PSA) (Primary)  -     PSA Total+% Free (Serial); Future  -     POC Urinalysis Dipstick, Automated    2. Benign prostatic hyperplasia with weak urinary stream  -     tamsulosin (FLOMAX) 0.4 MG capsule 24 hr capsule; Take 1 capsule by mouth Daily.  Dispense: 90 capsule; Refill: 3  -     tadalafil (CIALIS) 5 MG tablet; Take 1 tablet by mouth Daily.  Dispense: 90 tablet; Refill: 3  -     POC Urinalysis Dipstick, Automated       Assessment & Plan  1. Elevated PSA  - Persistent elevated PSA despite negative prostate biopsy last year. Most recent PSA was 9.9 ng/mL.   - Repeat PSA test in 1 year, along with percent-free PSA test.  I anticipate that PSA will remain elevated but stable.  Would like to repeat prostate MRI and/or Biopsy in the next 1-2 years depending on next PSA level.  Patient agreeable to this plan.   - Complete PSA test approximately 1 week prior to next appointment.    - Inform clinic if any issues arise within the next year    2. BPH  - UA benign.   - Renewed prescriptions for Flomax and Cialis for 90 days with 3 refills  - Medications sent to [facility] in [place]   - Provided Cialis coupon       Return in about 1 year (around 4/21/2026) for f/u with Tess with PSA, IPSS, PVR, and UA in [place] .    Tess Corley, MSN, APRN, FNP-C  [facility]

## 2025-05-12 ENCOUNTER — TRANSCRIBE ORDERS (OUTPATIENT)
Dept: LAB | Facility: HOSPITAL | Age: 60
End: 2025-05-12
Payer: COMMERCIAL

## 2025-05-12 ENCOUNTER — LAB (OUTPATIENT)
Dept: LAB | Facility: HOSPITAL | Age: 60
End: 2025-05-12
Payer: COMMERCIAL

## 2025-05-12 DIAGNOSIS — E78.5 DYSLIPIDEMIA: ICD-10-CM

## 2025-05-12 DIAGNOSIS — I25.10 ATHEROSCLEROSIS OF NATIVE CORONARY ARTERY WITHOUT ANGINA PECTORIS, UNSPECIFIED WHETHER NATIVE OR TRANSPLANTED HEART: ICD-10-CM

## 2025-05-12 DIAGNOSIS — E78.5 DYSLIPIDEMIA: Primary | ICD-10-CM

## 2025-05-12 LAB
ALBUMIN SERPL-MCNC: 4.3 G/DL (ref 3.5–5.2)
ALBUMIN/GLOB SERPL: 1.7 G/DL
ALP SERPL-CCNC: 88 U/L (ref 39–117)
ALT SERPL W P-5'-P-CCNC: 19 U/L (ref 1–41)
ANION GAP SERPL CALCULATED.3IONS-SCNC: 12.1 MMOL/L (ref 5–15)
AST SERPL-CCNC: 22 U/L (ref 1–40)
BILIRUB SERPL-MCNC: 0.3 MG/DL (ref 0–1.2)
BUN SERPL-MCNC: 11 MG/DL (ref 6–20)
BUN/CREAT SERPL: 9.2 (ref 7–25)
CALCIUM SPEC-SCNC: 9.2 MG/DL (ref 8.6–10.5)
CHLORIDE SERPL-SCNC: 106 MMOL/L (ref 98–107)
CHOLEST SERPL-MCNC: 133 MG/DL (ref 0–200)
CO2 SERPL-SCNC: 22.9 MMOL/L (ref 22–29)
CREAT SERPL-MCNC: 1.2 MG/DL (ref 0.76–1.27)
CRP SERPL-MCNC: <0.02 MG/DL (ref 0.01–0.5)
EGFRCR SERPLBLD CKD-EPI 2021: 70.1 ML/MIN/1.73
GLOBULIN UR ELPH-MCNC: 2.6 GM/DL
GLUCOSE SERPL-MCNC: 91 MG/DL (ref 65–99)
HBA1C MFR BLD: 5.7 % (ref 4.8–5.6)
HDLC SERPL-MCNC: 41 MG/DL (ref 40–60)
LDLC SERPL CALC-MCNC: 72 MG/DL (ref 0–100)
LDLC/HDLC SERPL: 1.7 {RATIO}
POTASSIUM SERPL-SCNC: 3.9 MMOL/L (ref 3.5–5.2)
PROT SERPL-MCNC: 6.9 G/DL (ref 6–8.5)
SODIUM SERPL-SCNC: 141 MMOL/L (ref 136–145)
TRIGL SERPL-MCNC: 112 MG/DL (ref 0–150)
VLDLC SERPL-MCNC: 20 MG/DL (ref 5–40)

## 2025-05-12 PROCEDURE — 36415 COLL VENOUS BLD VENIPUNCTURE: CPT

## 2025-05-12 PROCEDURE — 80061 LIPID PANEL: CPT

## 2025-05-12 PROCEDURE — 83036 HEMOGLOBIN GLYCOSYLATED A1C: CPT

## 2025-05-12 PROCEDURE — 86141 C-REACTIVE PROTEIN HS: CPT

## 2025-05-12 PROCEDURE — 80053 COMPREHEN METABOLIC PANEL: CPT

## (undated) DEVICE — Device

## (undated) DEVICE — SNAR POLYP SENSATION STDOVL 27 240 BX40

## (undated) DEVICE — KT ORCA VLV SXN AIR/H2O W/SEAL 1P/U STRL

## (undated) DEVICE — ENDOGATOR AUXILIARY WATER JET CONNECTOR: Brand: ENDOGATOR